# Patient Record
Sex: FEMALE | Race: WHITE | NOT HISPANIC OR LATINO | Employment: STUDENT | URBAN - METROPOLITAN AREA
[De-identification: names, ages, dates, MRNs, and addresses within clinical notes are randomized per-mention and may not be internally consistent; named-entity substitution may affect disease eponyms.]

---

## 2023-02-20 LAB — GROUP A STREP, PCR: NOT DETECTED

## 2023-02-22 LAB
EBV INTERPRETATION: ABNORMAL
EPSTEIN-BARR VCA IGG: POSITIVE
EPSTEIN-BARR VCA IGM: NEGATIVE
EPSTEIN-BARR VIRUS EARLY ANTIGEN ANTIBODY, IGG: NEGATIVE
EPSTIEN-BARR NUCLEAR ANTIGEN AB: POSITIVE

## 2023-03-09 PROBLEM — B37.31 VAGINAL YEAST INFECTION: Status: ACTIVE | Noted: 2023-03-09

## 2023-03-09 PROBLEM — F42.8 OBSESSIVE THINKING: Status: ACTIVE | Noted: 2023-03-09

## 2023-03-09 PROBLEM — Z04.9 CONDITION NOT FOUND: Status: ACTIVE | Noted: 2023-03-09

## 2023-03-09 PROBLEM — J02.9 SORE THROAT: Status: ACTIVE | Noted: 2023-03-09

## 2023-03-09 PROBLEM — U07.1 COVID-19: Status: ACTIVE | Noted: 2023-03-09

## 2023-03-09 PROBLEM — L70.9 ACNE: Status: ACTIVE | Noted: 2023-03-09

## 2023-03-09 PROBLEM — D16.9 ENCHONDROMA OF BONE: Status: ACTIVE | Noted: 2023-03-09

## 2023-03-09 PROBLEM — F41.9 ANXIETY: Status: ACTIVE | Noted: 2023-03-09

## 2023-03-09 PROBLEM — R53.83 FATIGUE: Status: ACTIVE | Noted: 2023-03-09

## 2023-03-09 PROBLEM — J35.1 TONSILLAR HYPERTROPHY: Status: ACTIVE | Noted: 2023-03-09

## 2023-03-09 RX ORDER — SERTRALINE HYDROCHLORIDE 50 MG/1
1 TABLET, FILM COATED ORAL DAILY
COMMUNITY
Start: 2021-09-13 | End: 2023-12-08 | Stop reason: SDUPTHER

## 2023-03-09 RX ORDER — NORGESTIMATE AND ETHINYL ESTRADIOL 0.25-0.035
1 KIT ORAL DAILY
COMMUNITY
Start: 2020-06-03 | End: 2023-10-13 | Stop reason: SDUPTHER

## 2023-03-09 RX ORDER — UBIDECARENONE 30 MG
CAPSULE ORAL
COMMUNITY

## 2023-03-10 ENCOUNTER — OFFICE VISIT (OUTPATIENT)
Dept: PEDIATRICS | Facility: CLINIC | Age: 23
End: 2023-03-10
Payer: COMMERCIAL

## 2023-03-10 VITALS — BODY MASS INDEX: 20.73 KG/M2 | WEIGHT: 119.8 LBS | TEMPERATURE: 97.8 F

## 2023-03-10 DIAGNOSIS — L04.9 ACUTE LYMPHADENITIS: Primary | ICD-10-CM

## 2023-03-10 DIAGNOSIS — J02.9 SORE THROAT: ICD-10-CM

## 2023-03-10 LAB
GROUP A STREP, PCR: NOT DETECTED
POC RAPID STREP: NEGATIVE

## 2023-03-10 PROCEDURE — 87880 STREP A ASSAY W/OPTIC: CPT | Performed by: PEDIATRICS

## 2023-03-10 PROCEDURE — 87651 STREP A DNA AMP PROBE: CPT

## 2023-03-10 PROCEDURE — 99214 OFFICE O/P EST MOD 30 MIN: CPT | Performed by: PEDIATRICS

## 2023-03-10 RX ORDER — AMOXICILLIN AND CLAVULANATE POTASSIUM 875; 125 MG/1; MG/1
1 TABLET, FILM COATED ORAL 2 TIMES DAILY
Qty: 20 TABLET | Refills: 1 | Status: SHIPPED | OUTPATIENT
Start: 2023-03-10 | End: 2023-03-20

## 2023-03-10 NOTE — PROGRESS NOTES
Subjective     Amara Field is here with her father for a sick visit.    H/o recurrent strep since December, 5th episode of  fever, swollen LN, URI. Dad is concerned because of recurrent infections.  She had mono positive blood work 3 weeks ago, old infection.    2 days ago Amara developed fever 102 and swollen LN left anterior cervical.  She is at [employer], lives in a house, lots of illnesses there but everyone else seems to get over them and Amara has not been able to. No weight loss, decreased appetite for a couple of days. No n/v/d now. Sometimes vomits  when sick/fever. Sleeping ok;  tired when sick. Exercising fine, no problem with weight lifting and rock climbing.        Objective     Temp 36.6 °C (97.8 °F) (Oral)   Wt 54.3 kg (119 lb 12.8 oz) Comment: 119.8lb  BMI 20.73 kg/m²       General:  Well-appearing  Well-hydrated  No acute distress   Eyes:  Lids:  normal  Conjunctivae:  normal   ENT:  Ears:  RTM: normal           LTM:  normal  Nose:  nasal secretions  Mouth:  mucosa moist; no visible lesions  Throat:  OP moist and clear; uvula midline tonsils 3+ noninfected  Neck:  supple; 2cm tender left anterior cervical LN, 1 cm right anterior cervical LN   Respiratory:  Respiratory rate:  normal  Air exchange:  normal   Adventitious breath sounds:  none  Accessory muscle use:  none   Heart:  Rate and rhythm:  regular  Murmur:  none    GI: Soft, NTND, NO HSM no masses   Skin:  Warm and well-perfused  No rashes apparent   Lymphatic: Shotty, 2 cm tender right >left cervical nodes  No other nodes larger than 1 cm palpated  No firm or fixed nodes palpated           Assessment/Plan       Amara Field is well-appearing, well-hydrated, in no acute distress, and afebrile at today's visit.    she may have lymphadenitis Left>Right.   I reviewed labs and notes from other recent visits.   Rapid strep was negative today, PCR sent.   RX for Augmentin for 10-14 days.     Refer to ENT to evaluate recurrent tonsillitis, strep  infections.     Supportive care measures and expected course of illness reviewed.    Follow up promptly for worsening or prolonged illness.

## 2023-04-27 ENCOUNTER — TELEPHONE (OUTPATIENT)
Dept: PEDIATRICS | Facility: CLINIC | Age: 23
End: 2023-04-27
Payer: COMMERCIAL

## 2023-04-27 ENCOUNTER — OFFICE VISIT (OUTPATIENT)
Dept: PEDIATRICS | Facility: CLINIC | Age: 23
End: 2023-04-27
Payer: COMMERCIAL

## 2023-04-27 VITALS — TEMPERATURE: 100.2 F | BODY MASS INDEX: 20.26 KG/M2 | WEIGHT: 117.13 LBS

## 2023-04-27 DIAGNOSIS — J02.9 SORE THROAT: ICD-10-CM

## 2023-04-27 LAB — POC RAPID STREP: NEGATIVE

## 2023-04-27 PROCEDURE — 87880 STREP A ASSAY W/OPTIC: CPT | Performed by: PEDIATRICS

## 2023-04-27 PROCEDURE — 87651 STREP A DNA AMP PROBE: CPT

## 2023-04-27 PROCEDURE — 99213 OFFICE O/P EST LOW 20 MIN: CPT | Performed by: PEDIATRICS

## 2023-04-27 ASSESSMENT — ENCOUNTER SYMPTOMS: FEVER: 1

## 2023-04-27 NOTE — PROGRESS NOTES
Amara Field is a 22 y.o. who presents for Fever.      Fever       Amara has had a sore throat and fatigue for several days.  She has had a low grade fever.  In addition, she has had issues with an intermittent left anterior cervical lymph node enlarging intermittently over the last few months.  She has an ENT eval for this scheduled  It currently has enlarged with this illness.  It is not painful.  She has not had cough or nasal congestion.  She states that she has had mono in the past (EBV panel done 2 months ago confirms this -- c/w remote infection).    Objective   Temp 37.9 °C (100.2 °F)   Wt 53.1 kg (117 lb 2 oz)   BMI 20.26 kg/m²     Physical Exam  Constitutional:       Appearance: Normal appearance.   HENT:      Right Ear: Tympanic membrane normal.      Left Ear: Tympanic membrane normal.      Nose: Nose normal.      Mouth/Throat:      Mouth: Mucous membranes are moist.      Pharynx: Posterior oropharyngeal erythema present.   Eyes:      Conjunctiva/sclera: Conjunctivae normal.   Cardiovascular:      Rate and Rhythm: Normal rate and regular rhythm.      Heart sounds: Normal heart sounds.   Pulmonary:      Effort: Pulmonary effort is normal.      Breath sounds: Normal breath sounds.   Abdominal:      General: Bowel sounds are normal.      Palpations: There is no hepatomegaly or splenomegaly.   Musculoskeletal:      Cervical back: Normal range of motion and neck supple.   Lymphadenopathy:      Cervical: Cervical adenopathy present.      Comments: There is a 3 x 2 cm non tender left anterior cervical lymph node.  There is no other significant cervical, supraclavicular, or axillary adenopathy.   Neurological:      Mental Status: She is alert.         Assessment/Plan   Amara has pharyngitis and a viral etiology is suspected.  Rapid strep was negative and a strep pcr was sent for confirmation.    Today we discussed a typical course of illness, symptomatic treatment, and signs of worsening/when to seek medical  care.    Problem List Items Addressed This Visit       Sore throat    Relevant Orders    POCT rapid strep A manually resulted (Completed)    Group A Streptococcus, PCR

## 2023-04-28 LAB — GROUP A STREP, PCR: NOT DETECTED

## 2023-06-29 ENCOUNTER — TELEPHONE (OUTPATIENT)
Dept: PEDIATRICS | Facility: CLINIC | Age: 23
End: 2023-06-29
Payer: COMMERCIAL

## 2023-06-29 DIAGNOSIS — R10.84 GENERALIZED ABDOMINAL PAIN: Primary | ICD-10-CM

## 2023-06-29 NOTE — TELEPHONE ENCOUNTER
Can you place a referral for a GI specialist. The physician is Dr. Dionisio Barry @ [facility]. Amara has a long h/o abdominal issues per mother.     [email or web address]    [phone]

## 2023-06-30 ENCOUNTER — TELEPHONE (OUTPATIENT)
Dept: PEDIATRICS | Facility: CLINIC | Age: 23
End: 2023-06-30
Payer: COMMERCIAL

## 2023-06-30 ENCOUNTER — OFFICE VISIT (OUTPATIENT)
Dept: PEDIATRICS | Facility: CLINIC | Age: 23
End: 2023-06-30
Payer: COMMERCIAL

## 2023-06-30 VITALS — WEIGHT: 113.25 LBS | BODY MASS INDEX: 19.59 KG/M2 | TEMPERATURE: 97.8 F

## 2023-06-30 DIAGNOSIS — G89.29 CHRONIC ABDOMINAL PAIN: Primary | ICD-10-CM

## 2023-06-30 DIAGNOSIS — J02.9 SORE THROAT: ICD-10-CM

## 2023-06-30 DIAGNOSIS — R10.9 CHRONIC ABDOMINAL PAIN: Primary | ICD-10-CM

## 2023-06-30 LAB
GROUP A STREP, PCR: DETECTED
POC RAPID STREP: NEGATIVE

## 2023-06-30 PROCEDURE — 99213 OFFICE O/P EST LOW 20 MIN: CPT | Performed by: PEDIATRICS

## 2023-06-30 PROCEDURE — 87880 STREP A ASSAY W/OPTIC: CPT | Performed by: PEDIATRICS

## 2023-06-30 PROCEDURE — 87651 STREP A DNA AMP PROBE: CPT

## 2023-06-30 RX ORDER — AMOXICILLIN AND CLAVULANATE POTASSIUM 875; 125 MG/1; MG/1
1 TABLET, FILM COATED ORAL 2 TIMES DAILY
Qty: 20 TABLET | Refills: 0 | Status: SHIPPED | OUTPATIENT
Start: 2023-06-30 | End: 2023-07-10

## 2023-06-30 NOTE — TELEPHONE ENCOUNTER
Mom calling- having tonsillectomy next week, however seems that she has strep again.  Coming in for eval.

## 2023-06-30 NOTE — PROGRESS NOTES
Amara Field is a 22 y.o. who presents for Sore Throat.    HPI  Amara has had sore throat, fever, and nausea/vomiting for the last 2 days.  She was seen in the ER a week ago for vomiting and abdominal pain, although this resolved and she does not think it is related.  Amara has chronic issues with pharyngitis and recurrent strep and is scheduled for a tonsillectomy in 2 weeks.    No cough, no nasal congestion.  Her friend has sore throat and fever.  In addition, Amara is requesting a referral to GI at the [facility] for issues with chronic abdominal pain, constipation, and intermittent vomiting.  Objective   Temp 36.6 °C (97.8 °F)   Wt 51.4 kg (113 lb 4 oz)   BMI 19.59 kg/m²     Physical Exam  Constitutional:       Appearance: Normal appearance.   HENT:      Right Ear: Tympanic membrane normal.      Left Ear: Tympanic membrane normal.      Nose: Nose normal.      Mouth/Throat:      Mouth: Mucous membranes are moist.      Pharynx: Posterior oropharyngeal erythema present.      Comments: 3+ tonsils  Eyes:      Conjunctiva/sclera: Conjunctivae normal.   Cardiovascular:      Rate and Rhythm: Normal rate and regular rhythm.      Heart sounds: Normal heart sounds.   Pulmonary:      Effort: Pulmonary effort is normal.      Breath sounds: Normal breath sounds.   Abdominal:      General: Bowel sounds are normal.      Tenderness: There is no abdominal tenderness.   Musculoskeletal:      Cervical back: Normal range of motion and neck supple.   Neurological:      Mental Status: She is alert.         Assessment/Plan   Amara has symptoms that are consistent with strep throat, although a viral etiology is also possible.  She is scheduled for tonsillectomy in the near future so she was presumptively started on Augmentin;  a strep pcr was sent and antibiotics will be stopped if it is negative.    A GI referral was printed for her.    Today we discussed a typical course of illness, symptomatic treatment, and signs of worsening/when to seek  medical care.

## 2023-07-01 ENCOUNTER — TELEPHONE (OUTPATIENT)
Dept: PEDIATRICS | Facility: CLINIC | Age: 23
End: 2023-07-01
Payer: COMMERCIAL

## 2023-07-10 LAB
ANION GAP IN SER/PLAS: 12 MMOL/L (ref 10–20)
CALCIUM (MG/DL) IN SER/PLAS: 9.5 MG/DL (ref 8.6–10.3)
CARBON DIOXIDE, TOTAL (MMOL/L) IN SER/PLAS: 24 MMOL/L (ref 21–32)
CHLORIDE (MMOL/L) IN SER/PLAS: 105 MMOL/L (ref 98–107)
CREATININE (MG/DL) IN SER/PLAS: 0.64 MG/DL (ref 0.5–1.05)
ERYTHROCYTE DISTRIBUTION WIDTH (RATIO) BY AUTOMATED COUNT: 13.9 % (ref 11.5–14.5)
ERYTHROCYTE MEAN CORPUSCULAR HEMOGLOBIN CONCENTRATION (G/DL) BY AUTOMATED: 32.6 G/DL (ref 32–36)
ERYTHROCYTE MEAN CORPUSCULAR VOLUME (FL) BY AUTOMATED COUNT: 83 FL (ref 80–100)
ERYTHROCYTES (10*6/UL) IN BLOOD BY AUTOMATED COUNT: 4.52 X10E12/L (ref 4–5.2)
GFR FEMALE: >90 ML/MIN/1.73M2
GLUCOSE (MG/DL) IN SER/PLAS: 93 MG/DL (ref 74–99)
HEMATOCRIT (%) IN BLOOD BY AUTOMATED COUNT: 37.4 % (ref 36–46)
HEMOGLOBIN (G/DL) IN BLOOD: 12.2 G/DL (ref 12–16)
LEUKOCYTES (10*3/UL) IN BLOOD BY AUTOMATED COUNT: 8.8 X10E9/L (ref 4.4–11.3)
PLATELETS (10*3/UL) IN BLOOD AUTOMATED COUNT: 353 X10E9/L (ref 150–450)
POTASSIUM (MMOL/L) IN SER/PLAS: 3.9 MMOL/L (ref 3.5–5.3)
SODIUM (MMOL/L) IN SER/PLAS: 137 MMOL/L (ref 136–145)
UREA NITROGEN (MG/DL) IN SER/PLAS: 10 MG/DL (ref 6–23)

## 2023-07-18 ENCOUNTER — HOSPITAL ENCOUNTER (OUTPATIENT)
Dept: DATA CONVERSION | Facility: HOSPITAL | Age: 23
End: 2023-07-18
Attending: OTOLARYNGOLOGY | Admitting: OTOLARYNGOLOGY
Payer: COMMERCIAL

## 2023-07-18 DIAGNOSIS — J35.01 CHRONIC TONSILLITIS: ICD-10-CM

## 2023-07-18 DIAGNOSIS — J03.91 ACUTE RECURRENT TONSILLITIS, UNSPECIFIED: ICD-10-CM

## 2023-08-02 LAB
COMPLETE PATHOLOGY REPORT: NORMAL
CONVERTED CLINICAL DIAGNOSIS-HISTORY: NORMAL
CONVERTED FINAL DIAGNOSIS: NORMAL
CONVERTED FINAL REPORT PDF LINK TO COPY AND PASTE: NORMAL
CONVERTED GROSS DESCRIPTION: NORMAL

## 2023-09-29 VITALS
DIASTOLIC BLOOD PRESSURE: 65 MMHG | HEART RATE: 75 BPM | SYSTOLIC BLOOD PRESSURE: 107 MMHG | TEMPERATURE: 97.5 F | RESPIRATION RATE: 20 BRPM

## 2023-09-30 NOTE — H&P
History of Present Illness:   Pregnant/Lactating:  ·  Are You Pregnant no   ·  Are You Currently Breastfeeding no     History Present Illness:  Reason for surgery: Chronic tonsillitis   HPI:    This patient is a 22-year-old female who was recently seen and evaluated in ENT clinic for episodes of recurrent tonsillitis.  Given her frequency of infections and  continued symptoms, decision was made to proceed to the operating room for tonsillectomy.  Risks, benefits, alternatives were discussed with the patient ultimately agreed to proceed.  She presents today for surgery.    Allergies:        Allergies:  ·  No Known Allergies :     Home Medication Review:   Home Medications Reviewed: yes     Impression/Procedure:   ·  Impression and Planned Procedure: Tonsillectomy       ERAS (Enhanced Recovery After Surgery):  ·  ERAS Patient: no       Vital Signs:  Temperature C: 36.4 degrees C   Temperature F: 97.5 degrees F   Heart Rate: 75 beats per minute   Respiratory Rate: 20 breath per minute   Blood Pressure Systolic: 107 mm/Hg   Blood Pressure Diastolic: 65 mm/Hg     Physical Exam by System:    Constitutional: Well developed, awake/alert/oriented  x3, no distress, alert and cooperative   Eyes: Sclera nonicteric.  EOMI   Head/Neck: Neck supple, no apparent injury, No JVD,  trachea midline   Respiratory/Thorax: Good chest expansion, thorax  symmetric.  Breathing unlabored   Cardiovascular: No signs of peripheral edema or cyanosis   Extremities: Moving all extremities spontaneously.   No evidence of clubbing or contracture   Neurological: alert and oriented x3, intact senses,  normal strength   Psychological: Appropriate mood and behavior   Skin: Warm and dry, no lesions, no rashes     Consent:   COVID-19 Consent:  ·  COVID-19 Risk Consent Surgeon has reviewed key risks related to the risk of margot COVID-19 and if they contract COVID-19 what the risks are.     Attestation:   Note Completion:  I am a:  Resident/Fellow    Attending Attestation I saw and evaluated the patient.  I personally obtained the key and critical portions of the history and physical exam or was physically present for key and  critical portions performed by the resident/fellow. I reviewed the resident/fellow?s documentation and discussed the patient with the resident/fellow.  I agree with the resident/fellow?s medical decision making as documented in the note.     I personally evaluated the patient on 18-Jul-2023         Electronic Signatures:  Rey Peralta (Resident))  (Signed 18-Jul-2023 07:15)   Authored: History of Present Illness, Allergies, Home  Medication Review, Impression/Procedure, ERAS, Physical Exam, Consent, Note Completion  Jessica Vogel)  (Signed 27-Jul-2023 13:26)   Authored: Note Completion   Co-Signer: History of Present Illness, Allergies, Home Medication Review, Impression/Procedure, ERAS, Physical Exam, Consent, Note Completion      Last Updated: 27-Jul-2023 13:26 by Jessica Vogel)

## 2023-10-02 NOTE — OP NOTE
PROCEDURE DETAILS    Preoperative Diagnosis:  Chronic tonsillitis  Postoperative Diagnosis:  Chronic tonsillitis  Surgeon: Jessica Vogel MD  Resident/Fellow/Other Assistant: Rey Peralta MD    Procedure:  1. Tonsillectomy  Anesthesia: General  Estimated Blood Loss: 10  Findings: 1. 2+ tonsils, inflamed with obscured surgical planes  Specimens(s) Collected: yes,  Bilateral tonsils   Complications: None  Patient Returned To/Condition: PACU/Satisfactory        Operative Report:   Operative Indications:  This patient is a 22-year-old female who presented to outpatient ENT clinic with recurrent tonsillitis, for whom decision was made to proceed to the operating room for tonsillectomy.  Risks, benefits, alternatives were discussed with patient ultimately  agreed to proceed.  Informed consent was obtained.    Description of Procedure:  The patient was brought to the operating room by anesthesia, induced under general endotracheal anesthesia. The patient was turned 90 degrees counterclockwise. A Nikki Lg mouth gag was used to expose the oropharynx. The palate was carefully inspected.  Torus palatinus noted. At this point, the right tonsil was grasped and retracted medially. Using electrocautery at a setting of 25 the tonsils was freed in a superior-to-inferior direction preserving both the anterior and posterior pillars.  Attention  was turned to the left tonsil. Exact same procedure was performed. The oropharynx was irrigated. Hemostasis was achieved with suction electrocautery.     The stomach was suctioned with orogastric tube, and the patient was turned towards Anesthesia, awoken, and transferred to the PACU in stable condition.    Dr. Vogel was present and participated in all critical portions of the procedure.                        Attestation:   Note Completion:  Attending Attestation I was present for key portions of the procedure and the procedure lasted longer than 5 minutes.    I am a:  Resident/Fellow         Electronic Signatures:  Rey Peralta (Resident))  (Signed 18-Jul-2023 08:59)   Authored: Post-Operative Note, Chart Review, Note Completion  Jessica Vogel)  (Signed 27-Jul-2023 13:54)   Authored: Post-Operative Note, Chart Review, Note Completion   Co-Signer: Post-Operative Note, Chart Review, Note Completion      Last Updated: 27-Jul-2023 13:54 by Jessica Vogel)

## 2023-10-12 PROBLEM — R53.83 FATIGUE: Status: RESOLVED | Noted: 2023-03-09 | Resolved: 2023-10-12

## 2023-10-12 PROBLEM — Z04.9 CONDITION NOT FOUND: Status: RESOLVED | Noted: 2023-03-09 | Resolved: 2023-10-12

## 2023-10-12 PROBLEM — J35.1 TONSILLAR HYPERTROPHY: Status: RESOLVED | Noted: 2023-03-09 | Resolved: 2023-10-12

## 2023-10-12 PROBLEM — B37.31 VAGINAL YEAST INFECTION: Status: RESOLVED | Noted: 2023-03-09 | Resolved: 2023-10-12

## 2023-10-12 PROBLEM — J02.9 SORE THROAT: Status: RESOLVED | Noted: 2023-03-09 | Resolved: 2023-10-12

## 2023-10-12 PROBLEM — J03.91 RECURRENT ACUTE TONSILLITIS: Status: RESOLVED | Noted: 2023-10-12 | Resolved: 2023-10-12

## 2023-10-12 PROBLEM — U07.1 COVID-19: Status: RESOLVED | Noted: 2023-03-09 | Resolved: 2023-10-12

## 2023-10-13 ENCOUNTER — OFFICE VISIT (OUTPATIENT)
Dept: PEDIATRICS | Facility: CLINIC | Age: 23
End: 2023-10-13
Payer: COMMERCIAL

## 2023-10-13 VITALS
HEIGHT: 64 IN | WEIGHT: 118 LBS | HEART RATE: 67 BPM | SYSTOLIC BLOOD PRESSURE: 117 MMHG | DIASTOLIC BLOOD PRESSURE: 75 MMHG | BODY MASS INDEX: 20.14 KG/M2

## 2023-10-13 DIAGNOSIS — Z30.011 ENCOUNTER FOR INITIAL PRESCRIPTION OF CONTRACEPTIVE PILLS: ICD-10-CM

## 2023-10-13 DIAGNOSIS — Z23 ENCOUNTER FOR IMMUNIZATION: ICD-10-CM

## 2023-10-13 DIAGNOSIS — Z00.00 WELLNESS EXAMINATION: Primary | ICD-10-CM

## 2023-10-13 PROCEDURE — 90715 TDAP VACCINE 7 YRS/> IM: CPT | Performed by: PEDIATRICS

## 2023-10-13 PROCEDURE — 99395 PREV VISIT EST AGE 18-39: CPT | Performed by: PEDIATRICS

## 2023-10-13 PROCEDURE — 90471 IMMUNIZATION ADMIN: CPT | Performed by: PEDIATRICS

## 2023-10-13 PROCEDURE — 1036F TOBACCO NON-USER: CPT | Performed by: PEDIATRICS

## 2023-10-13 PROCEDURE — 96127 BRIEF EMOTIONAL/BEHAV ASSMT: CPT | Performed by: PEDIATRICS

## 2023-10-13 PROCEDURE — 3008F BODY MASS INDEX DOCD: CPT | Performed by: PEDIATRICS

## 2023-10-13 RX ORDER — NORGESTIMATE AND ETHINYL ESTRADIOL 0.25-0.035
1 KIT ORAL DAILY
Qty: 84 TABLET | Refills: 3 | Status: SHIPPED | OUTPATIENT
Start: 2023-10-13 | End: 2024-10-12

## 2023-10-13 ASSESSMENT — PATIENT HEALTH QUESTIONNAIRE - PHQ9
1. LITTLE INTEREST OR PLEASURE IN DOING THINGS: NOT AT ALL
2. FEELING DOWN, DEPRESSED OR HOPELESS: NOT AT ALL
SUM OF ALL RESPONSES TO PHQ9 QUESTIONS 1 AND 2: 0

## 2023-10-13 NOTE — PROGRESS NOTES
"Graciela Maloney is here her annual well visit.    Questions or concerns:  She quit taking OCPs when she went off Accutane, but she would like to restart.  She is s/p tonsillectomy    Nutrition, Elimination, and Sleep:  Nutrition:  well-balanced diet  Elimination:  normal frequency and quality of stool  Sleep:  adequate, no snoring identified    Currently menstruating? yes; current menstrual pattern: regular every month without intermenstrual spotting    Social:  Peer relations:  no concerns  Family relations:  no concerns  School performance:  no concerns  Teen questionnaire:  reviewed  Activities:  sustainability, working at an office    Objective   /75   Pulse 67   Ht 1.623 m (5' 3.88\")   Wt 53.5 kg (118 lb)   BMI 20.33 kg/m²   Physical Exam  Vitals reviewed.   Constitutional:       General: She is not in acute distress.     Appearance: Normal appearance. She is not ill-appearing.   HENT:      Head: Normocephalic and atraumatic.      Right Ear: Tympanic membrane, ear canal and external ear normal.      Left Ear: Tympanic membrane, ear canal and external ear normal.      Nose: Nose normal.      Mouth/Throat:      Mouth: Mucous membranes are moist.      Pharynx: Oropharynx is clear.   Eyes:      Extraocular Movements: Extraocular movements intact.      Conjunctiva/sclera: Conjunctivae normal.      Pupils: Pupils are equal, round, and reactive to light.   Neck:      Thyroid: No thyroid mass or thyromegaly.   Cardiovascular:      Rate and Rhythm: Normal rate and regular rhythm.      Pulses: Normal pulses.      Heart sounds: Normal heart sounds. No murmur heard.     No gallop.   Pulmonary:      Effort: Pulmonary effort is normal. No respiratory distress.      Breath sounds: Normal breath sounds.   Chest:   Breasts:     Ankur Score is 5.   Abdominal:      General: There is no distension.      Palpations: Abdomen is soft. There is no hepatomegaly, splenomegaly or mass.      Tenderness: There is no " abdominal tenderness.      Hernia: No hernia is present.   Genitourinary:     Ankur stage (genital): 5.   Musculoskeletal:         General: No swelling, deformity or signs of injury. Normal range of motion.      Cervical back: Normal range of motion and neck supple.      Thoracic back: No scoliosis.   Lymphadenopathy:      Comments: no significant lymphadenopathy > 1 cm   Skin:     General: Skin is warm and dry.   Neurological:      General: No focal deficit present.      Motor: No weakness.   Psychiatric:         Mood and Affect: Mood normal.         Thought Content: Thought content normal.          Assessment/Plan   1. Wellness examination        2. Encounter for immunization  Tdap vaccine, age 7 years and older      3. BMI (body mass index), pediatric, 5% to less than 85% for age        4. Encounter for initial prescription of contraceptive pills  norgestimate-ethinyl estradioL (Sprintec, 28,) 0.25-35 mg-mcg tablet         Amara is a healthy and thriving adult.    - Guidance regarding safety, nutrition, physical activity, and sleep reviewed.  - Social:  teenage questionnaire completed and reviewed.  Issues of smoking, vaping, substance use, sexuality, and mood discussed.    - Vaccines:  as documented; declines flu and COVID-19 today  - Return in 1 year for annual well exam or sooner if concerns arise

## 2023-10-13 NOTE — PATIENT INSTRUCTIONS
"You received a Tdap (tetanus booster) today    GYNECOLOGISTS:     [phone]  Dr. Suyapa Graves - [facility]  Dr. Genesis Cornejo - [facility]  Dr. Marixa Robertson - [facility]  Dr. Corinne Bazella - [facility]  Dr. Michelle Quintero - [facility]  Dr. Yoly Nix - [place]    [phone]  Dr. Gill Goyal, [place]    [phone]  Dr. Anthony Mixon - [place]    [phone]  Dr. Rosalia Mariee - [facility]  [phone]  Dr. Carol Lim and partners ([place])    [phone]  Dr. Geri Reynolds and partners ([place])    [phone]  Dr. Yareli Kirkpatrick ([facility])      Taking Oral Contraceptives    How they work  The hormones in your birth control pill prevent pregnancy primarily by:  Preventing the ovary from releasing an egg  Thickening the mucus at the entrance to the uterus, blocking the sperm from getting in  When birth control pills are taken properly, the chance of becoming pregnant is ~5 in 100.    Taking your pill  Take one pill every day, preferably at the same time each day. The pill only works if taken every day, even if you don't have sex very often.    Start your pills the first Sunday after the first day of your next period, or the first day of your period if your period begins on a Sunday. The pill will become effective after you take it for 7 days.  Although using a condom for disease protection decreases your pregnancy risk, the safest thing to do is abstain from sex until you've taken a full week-of pills.    Most pill packs have two kinds of pills: \"active\" pills with hormone, and \"placebo\" pills without hormone. You need to take all the active pills to prevent pregnancy.  Taking the placebos is the trigger for your period.  Take the placebo pills unless your healthcare provider tells you otherwise. There is no sugar in the placebo pills.    If you bleed between your periods  Breakthrough " "bleeding is common and expected in the first 3 months of pill use. It can be as light as spotting or as heavy as a period. DO NOT skip or stop your pills. DO call your health care provider if you are concerned.    What to do if you miss an \"active\" pill:  If you are late taking a pill, take it as soon as you remember it.  If you miss taking a pill for one day, take it as soon as you remember it the next day.  Take that day's pill at the regular time. If this means taking 2 pills at once, that's ok.  If you miss two pills, take 2 pills a day for the next two days, then finish your pack of pills as usual. You are at risk for getting pregnant in the 7 days after missing pills.  Although using a condom for disease protection decreases your pregnancy risk, the safest thing to do is abstain from sex until you've taken a full week of pills again.  If you miss more than 2 pills, throw out the pack of pills and start a new pack that day.  You are at risk for getting pregnant in the 7 days after missing pills. Although using a condom for disease protection decreases your -pregnancy risk, the safest thing to do is abstain from sex until you've taken a full week of pills again.    Call you doctor right away if you have:  Severe stomach or chest pain  Trouble breathing  Unusual, severe, or worsening headaches  Difficulty seeing  Leg pain or swelling    Other benefits  Taking oral contraceptives may have certain additional benefits:  Lighter, less painful, and more regular periods  Decreased acne and less unwanted hair growth  Fewer ovarian cysts  Fewer benign (harmless) breast lumps  Fewer pelvic infections  Lower risk of ovarian and uterine cancer  Stronger bones    What are the risks?  Women taking the pill have a small increased risk for getting a blood clot of the leg or lung. This risk is very low: about 1 in 15,000 women.    If you have any of the following conditions, you should not take the pill: breast or uterine " cancer, unexplained vaginal bleeding, heart disease, blood clot or stroke, or liver disease. If you take any other medications or herbal supplements, discuss this with your health care provider.    The birth control pill does not protect against HIV (AIDS) or other sexually transmitted infections. Regardless of contraceptive method, always use a condom!

## 2023-12-08 ENCOUNTER — TELEMEDICINE (OUTPATIENT)
Dept: BEHAVIORAL HEALTH | Facility: CLINIC | Age: 23
End: 2023-12-08
Payer: COMMERCIAL

## 2023-12-08 DIAGNOSIS — F42.8 OBSESSIVE THINKING: ICD-10-CM

## 2023-12-08 DIAGNOSIS — F41.9 ANXIETY: ICD-10-CM

## 2023-12-08 PROCEDURE — 99213 OFFICE O/P EST LOW 20 MIN: CPT | Performed by: PSYCHIATRY & NEUROLOGY

## 2023-12-08 RX ORDER — SERTRALINE HYDROCHLORIDE 50 MG/1
50 TABLET, FILM COATED ORAL DAILY
Qty: 30 TABLET | Refills: 2 | Status: SHIPPED | OUTPATIENT
Start: 2023-12-08 | End: 2024-02-09 | Stop reason: SDUPTHER

## 2023-12-08 NOTE — PROGRESS NOTES
"Subjective    All Individuals Present: Patient and Provider (Encounter Provider)     ID: Amara Field is a 23 y.o. female with anxiety    School Address: [address] [place] [address]     Interval History/HPI/PFSH:  Pt states that she has been \"pretty good\" since last appointment. She has finals right now which is \"laura stressful\" with 1 online final and 3 finals next week on [employer]. She feels \"okay\" about finals, \"not super nervous.\" She thinks she is going to do \"fine\" and has \"pretty good grades.\"    The semester has been \"okay\" so far, \"not too stressful.\"    On ROS, she is future-oriented to finals. She has been enjoying hanging out with people. She reports anxiety is well controlled, usually a 2/10, higher now due to finals. Mood overall \"pretty good,\" \"a lot better than it was.\" Denies feeling down or depressed. No panic attacks. No change in sleep or appetite. No SI. No HI. No AVH.     Medication side effects: None Reported     Review of Systems  See HPI.    Objective   There were no vitals taken for this visit.  Wt Readings from Last 4 Encounters:   10/13/23 53.5 kg (118 lb)   06/30/23 51.4 kg (113 lb 4 oz)   05/03/23 52 kg (114 lb 11.2 oz)   04/27/23 53.1 kg (117 lb 2 oz)       Mental Status Exam  General Appearance: Well groomed, appropriate eye contact.  Attitude/Behavior: Cooperative, conversant, engaged, and with good eye contact.  Motor: No psychomotor agitation or retardation, no tremor or other abnormal movements.  Speech: Normal rate, volume, prosody  Gait/Station: Within normal limits  Mood: \"pretty good\".  Affect: Euthymic, full-range  Thought Process: Linear, goal directed  Thought Associations: No loosening of associations  Thought Content: normal  Sensorium: Alert and oriented to person, place, time and situation  Insight:  intact  Judgment: Intact  Cognition: Cognitively intact to conversational testing with respect to attention, orientation, fund of knowledge, recent and remote " memory, and language.  Testing: N/A.    Laboratory/Imaging/Diagnostic Tests   N/A    Assessment/Plan   Overall Formulation and Differential Diagnosis:  Amara Field is a 23 y.o. female who meets criteria for anxiety and obsessional thinking  Interval Assessment:  Doing well with well controlled anxiety.  Risk Assessment:  Imminent Risk of Suicide or Serious Self-Injury: Low Risk -- Risk factors include: Age Protective factors include:Denies current suicidal ideation, Denies history of suicide attempts , Future-oriented talk , Willingness to seek help and support , Skills in problem solving, conflict resolution, and nonviolent handling of disputes, Cultural and [employer]ism beliefs that discourage suicide and support self-preservation , Access to a variety of clinical interventions , Receiving and engaged in care for mental, physical, and substance use disorders , History of adhering to treatment recommendations and/or prescribed medication regimen , Support through ongoing medical and mental healthcare relationships , Current/history of good response to treatment/meds , and Interpersonal relationships and supports, e.g., family, friends, peers, community   Imminent Risk of Violence or Homicide: Low Risk - Risk factors include: No significant risk factors identified on screening. Protective factors include: Lack of known history of harm to others , Lack of known history of violent ideation , Lack of known access to firearms , Sense of community, availability/access to resources and support , Sense of optimism, hope , Interpersonal competence , Affect regulation , Sense of self-efficacy, internal locus of control , and Positive, pro-social family/peer network   Treatment Plan:  There are no recently modified care plans to display for this patient.    -continue Zoloft 50mg PO daily.  -RTC 2-3 months.

## 2024-02-02 ENCOUNTER — APPOINTMENT (OUTPATIENT)
Dept: BEHAVIORAL HEALTH | Facility: CLINIC | Age: 24
End: 2024-02-02
Payer: COMMERCIAL

## 2024-02-09 ENCOUNTER — TELEMEDICINE (OUTPATIENT)
Dept: BEHAVIORAL HEALTH | Facility: CLINIC | Age: 24
End: 2024-02-09
Payer: COMMERCIAL

## 2024-02-09 DIAGNOSIS — F41.9 ANXIETY: ICD-10-CM

## 2024-02-09 DIAGNOSIS — F42.8 OBSESSIVE THINKING: ICD-10-CM

## 2024-02-09 PROCEDURE — 1036F TOBACCO NON-USER: CPT | Performed by: PSYCHIATRY & NEUROLOGY

## 2024-02-09 PROCEDURE — 99214 OFFICE O/P EST MOD 30 MIN: CPT | Performed by: PSYCHIATRY & NEUROLOGY

## 2024-02-09 PROCEDURE — 3008F BODY MASS INDEX DOCD: CPT | Performed by: PSYCHIATRY & NEUROLOGY

## 2024-02-09 RX ORDER — SERTRALINE HYDROCHLORIDE 50 MG/1
50 TABLET, FILM COATED ORAL DAILY
Qty: 30 TABLET | Refills: 2 | Status: SHIPPED | OUTPATIENT
Start: 2024-02-09 | End: 2024-04-19 | Stop reason: SDUPTHER

## 2024-02-09 NOTE — PROGRESS NOTES
"Subjective    All Individuals Present: Patient and Provider (Encounter Provider)     ID: Amara Field is a 23 y.o. female with anxiety and obsessional thinking    School Address: [address] [place] [address]      Interval History/HPI/PFSH:  Pt states she has been \"pretty decent\" since last visit. Finals were \"pretty stressful,\" but overall pt has been \"pretty good.\"    Finals went well, though she had 3 finals in a row, \"all went perfectly.\" Grades were \"pretty good.\" She plans to retake stats.    This semester has been \"really good\" so far. She had her first exam 2 days ago and thinks it went pretty well. 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    On ROS, she has been enjoying playing pickleball outside and snowboarding. Mood has been \"pretty good.\" She is able to get all of her work done early and feels like it was \"done well.\" She denies feeling down or depressed. She was \"a little anxious\" at the start of the semester but feels like it has evened out. No change in sleep or appetite. Energy \"higher than usual,\" which she attributes to good quality sleep. No SI/HI/AVH.     Medication side effects: None Reported     Review of Systems  See HPI.    Objective   There were no vitals taken for this visit.  Wt Readings from Last 4 Encounters:   10/13/23 53.5 kg (118 lb)   08/07/23 51.6 kg (113 lb 12.8 oz)   06/30/23 51.4 kg (113 lb 4 oz)   05/03/23 52 kg (114 lb 11.2 oz)       Mental Status Exam  General Appearance: Well groomed, appropriate eye contact.  Attitude/Behavior: Cooperative, conversant, engaged, and with good eye contact.  Motor: No psychomotor agitation or retardation, no tremor or other abnormal movements.  Speech: Normal rate, volume, prosody  Gait/Station: Within normal limits  Mood: \"pretty good\".  Affect: Euthymic, full-range  Thought Process: Linear, goal directed  Thought Associations: No loosening of associations  Thought Content: " normal  Sensorium: Alert and oriented to person, place, time and situation  Insight:  intact  Judgment: Intact  Cognition: Cognitively intact to conversational testing with respect to attention, orientation, fund of knowledge, recent and remote memory, and language.  Testing: N/A.    Laboratory/Imaging/Diagnostic Tests       Assessment/Plan   Overall Formulation and Differential Diagnosis:  Amara Field is a 23 y.o. female who meets criteria for anxiety and obsessive thinking  Interval Assessment:  Pt doing well, adjusting to spring semester and enjoying her classes.  Risk Assessment:  Imminent Risk of Suicide or Serious Self-Injury: Low Risk -- Risk factors include: Age Protective factors include:Denies current suicidal ideation, Denies history of suicide attempts , Future-oriented talk , Willingness to seek help and support , Skills in problem solving, conflict resolution, and nonviolent handling of disputes, Cultural and Taoist beliefs that discourage suicide and support self-preservation , Access to a variety of clinical interventions , Receiving and engaged in care for mental, physical, and substance use disorders , History of adhering to treatment recommendations and/or prescribed medication regimen , Support through ongoing medical and mental healthcare relationships , Current/history of good response to treatment/meds , and Interpersonal relationships and supports, e.g., family, friends, peers, community   Imminent Risk of Violence or Homicide: Low Risk - Risk factors include: No significant risk factors identified on screening. Protective factors include: Lack of known history of harm to others , Lack of known history of violent ideation , Lack of known access to firearms , Sense of community, availability/access to resources and support , Sense of optimism, hope , Interpersonal competence , Affect regulation , Sense of self-efficacy, internal locus of control , and Positive, pro-social family/peer  network   Treatment Plan:  There are no recently modified care plans to display for this patient.    -continue Zoloft 50mg PO daily  -continue individual therapy  -RTC 2-3 months

## 2024-03-12 ENCOUNTER — OFFICE VISIT (OUTPATIENT)
Dept: PEDIATRICS | Facility: CLINIC | Age: 24
End: 2024-03-12
Payer: COMMERCIAL

## 2024-03-12 VITALS — WEIGHT: 125.2 LBS | TEMPERATURE: 98.6 F | BODY MASS INDEX: 21.57 KG/M2

## 2024-03-12 DIAGNOSIS — I88.9 LYMPHADENITIS: Primary | ICD-10-CM

## 2024-03-12 PROCEDURE — 3008F BODY MASS INDEX DOCD: CPT | Performed by: PEDIATRICS

## 2024-03-12 PROCEDURE — 1036F TOBACCO NON-USER: CPT | Performed by: PEDIATRICS

## 2024-03-12 PROCEDURE — 99214 OFFICE O/P EST MOD 30 MIN: CPT | Performed by: PEDIATRICS

## 2024-03-12 RX ORDER — AMOXICILLIN AND CLAVULANATE POTASSIUM 875; 125 MG/1; MG/1
1 TABLET, FILM COATED ORAL 2 TIMES DAILY
Qty: 20 TABLET | Refills: 0 | Status: SHIPPED | OUTPATIENT
Start: 2024-03-12 | End: 2024-03-22

## 2024-03-12 NOTE — PROGRESS NOTES
Subjective   Patient ID: Amara Field is a 23 y.o. female who is here for concern of Sore Throat.    HPI  Her illness started 4 days ago.  Three days ago she was seen at an outside urgent care where she tested negative for COVID-19, flu A, fluB, and strep.  She notes that she can tell that she is starting to feel better and the fever has been gone for ~ 2 days, but she awoke with severe pain in the R side of her posterior throat.      Objective   Temperature 37 °C (98.6 °F), weight 56.8 kg (125 lb 3.2 oz).  Physical Exam  Constitutional:       General: She is not in acute distress.     Appearance: Normal appearance. She is not toxic-appearing.   HENT:      Right Ear: Tympanic membrane and ear canal normal.      Left Ear: Tympanic membrane and ear canal normal.      Nose: Congestion (mild) present.      Mouth/Throat:      Mouth: Mucous membranes are moist.      Pharynx: Posterior oropharyngeal erythema (mild R palatine arch, no ulcer) present. No oropharyngeal exudate.   Eyes:      Conjunctiva/sclera: Conjunctivae normal.   Cardiovascular:      Rate and Rhythm: Normal rate and regular rhythm.   Pulmonary:      Effort: Pulmonary effort is normal.      Breath sounds: Normal breath sounds.   Musculoskeletal:      Cervical back: Neck supple.   Lymphadenopathy:      Cervical: Cervical adenopathy present.      Right cervical: Superficial cervical adenopathy (tender, ~ 2.5 cm diameter, non-fluctuant, mobile) present.      Left cervical: Superficial cervical adenopathy (~ 2 cm diam, mobile and nontender, nonfluctuant) present.       Assessment/Plan   Problem List Items Addressed This Visit    None  Visit Diagnoses       Lymphadenitis    -  Primary    Relevant Medications    amoxicillin-pot clavulanate (Augmentin) 875-125 mg tablet        Amara appears to have acute R anterior cervical lymphadenitis.  I will err on the side of treating her for a bacterial etiology of this.  Symptomatic treatment discussed.  Follow-up if not  starting to improve in 3 days or sooner if worsens

## 2024-03-29 ENCOUNTER — LAB (OUTPATIENT)
Dept: LAB | Facility: LAB | Age: 24
End: 2024-03-29
Payer: COMMERCIAL

## 2024-03-29 ENCOUNTER — OFFICE VISIT (OUTPATIENT)
Dept: PEDIATRICS | Facility: CLINIC | Age: 24
End: 2024-03-29
Payer: COMMERCIAL

## 2024-03-29 VITALS — BODY MASS INDEX: 21.83 KG/M2 | WEIGHT: 126.7 LBS | TEMPERATURE: 98.3 F

## 2024-03-29 DIAGNOSIS — I88.9 LYMPHADENITIS: Primary | ICD-10-CM

## 2024-03-29 DIAGNOSIS — I88.9 LYMPHADENITIS: ICD-10-CM

## 2024-03-29 LAB
BASOPHILS # BLD AUTO: 0.02 X10*3/UL (ref 0–0.1)
BASOPHILS NFR BLD AUTO: 0.3 %
EOSINOPHIL # BLD AUTO: 0.07 X10*3/UL (ref 0–0.7)
EOSINOPHIL NFR BLD AUTO: 1 %
ERYTHROCYTE [DISTWIDTH] IN BLOOD BY AUTOMATED COUNT: 12.8 % (ref 11.5–14.5)
HCT VFR BLD AUTO: 37.7 % (ref 36–46)
HGB BLD-MCNC: 12.4 G/DL (ref 12–16)
IMM GRANULOCYTES # BLD AUTO: 0.01 X10*3/UL (ref 0–0.7)
IMM GRANULOCYTES NFR BLD AUTO: 0.1 % (ref 0–0.9)
LYMPHOCYTES # BLD AUTO: 2.87 X10*3/UL (ref 1.2–4.8)
LYMPHOCYTES NFR BLD AUTO: 41.1 %
MCH RBC QN AUTO: 27.6 PG (ref 26–34)
MCHC RBC AUTO-ENTMCNC: 32.9 G/DL (ref 32–36)
MCV RBC AUTO: 84 FL (ref 80–100)
MONOCYTES # BLD AUTO: 0.36 X10*3/UL (ref 0.1–1)
MONOCYTES NFR BLD AUTO: 5.2 %
NEUTROPHILS # BLD AUTO: 3.65 X10*3/UL (ref 1.2–7.7)
NEUTROPHILS NFR BLD AUTO: 52.3 %
NRBC BLD-RTO: 0 /100 WBCS (ref 0–0)
PLATELET # BLD AUTO: 355 X10*3/UL (ref 150–450)
RBC # BLD AUTO: 4.5 X10*6/UL (ref 4–5.2)
WBC # BLD AUTO: 7 X10*3/UL (ref 4.4–11.3)

## 2024-03-29 PROCEDURE — 99214 OFFICE O/P EST MOD 30 MIN: CPT | Performed by: PEDIATRICS

## 2024-03-29 PROCEDURE — 3008F BODY MASS INDEX DOCD: CPT | Performed by: PEDIATRICS

## 2024-03-29 PROCEDURE — 36415 COLL VENOUS BLD VENIPUNCTURE: CPT

## 2024-03-29 PROCEDURE — 85025 COMPLETE CBC W/AUTO DIFF WBC: CPT

## 2024-03-29 RX ORDER — CLINDAMYCIN HYDROCHLORIDE 300 MG/1
300 CAPSULE ORAL 3 TIMES DAILY
Qty: 30 CAPSULE | Refills: 0 | Status: SHIPPED | OUTPATIENT
Start: 2024-03-29 | End: 2024-04-08

## 2024-03-29 NOTE — PROGRESS NOTES
Amara Field is a 23 y.o. female who presents for Follow-up (Pt not any better).      HPI      Amara was seen a few weeks for not feeling well at an urgent care.  She had negative strep, COVID, Flu and B testing.  She then developed a swollen tender gland and was seen here and treated for an acute lymphadenitis with Augmentin for 10 days. SX's improved.  She has been off abx for about 5 days and now hs recurrence of not feeling well- swollen glands are now back and had gone down - more tired.     She had acute EBV last year 2/2023= labs reviewed and had subsequent T and A due to recurrent sore throats.      Objective   Temp 36.8 °C (98.3 °F)   Wt 57.5 kg (126 lb 11.2 oz)   BMI 21.83 kg/m²     Physical Exam  Constitutional:       Appearance: Normal appearance.   HENT:      Head: Normocephalic.      Right Ear: Tympanic membrane normal.      Left Ear: Tympanic membrane normal.      Mouth/Throat:      Mouth: Mucous membranes are moist.      Pharynx: No posterior oropharyngeal erythema.   Eyes:      Conjunctiva/sclera: Conjunctivae normal.   Cardiovascular:      Rate and Rhythm: Normal rate and regular rhythm.      Heart sounds: Normal heart sounds. No murmur heard.  Pulmonary:      Effort: Pulmonary effort is normal.      Breath sounds: Normal breath sounds. No wheezing.   Abdominal:      Palpations: Abdomen is soft. There is no hepatomegaly or splenomegaly.      Tenderness: There is no abdominal tenderness.   Lymphadenopathy:      Cervical: Cervical adenopathy present.      Right cervical: Superficial cervical adenopathy present.      Left cervical: Superficial cervical adenopathy present.      Comments: 1-2 cm - mobile and mildly tender         Assessment/Plan       Her clinical presentation and examination indicates the diagnosis of   1. Lymphadenitis  CBC and Auto Differential    clindamycin (Cleocin) 300 mg capsule            Her treatment plan includes a second course of abx.    A screening CBC was sent.  Her  sx's may be from a different viral infectious mono other than EBV- such as adenovirus or cytomegalovirus.    Today we discussed a typical course of illness, symptomatic treatment, and signs of worsening/when to seek medical care.  Supportive care measures and expected course of condition reviewed.  Followup as needed no improvement.

## 2024-04-19 ENCOUNTER — TELEMEDICINE (OUTPATIENT)
Dept: BEHAVIORAL HEALTH | Facility: CLINIC | Age: 24
End: 2024-04-19
Payer: COMMERCIAL

## 2024-04-19 DIAGNOSIS — F41.9 ANXIETY: ICD-10-CM

## 2024-04-19 DIAGNOSIS — F42.8 OBSESSIVE THINKING: ICD-10-CM

## 2024-04-19 PROCEDURE — 99214 OFFICE O/P EST MOD 30 MIN: CPT | Performed by: PSYCHIATRY & NEUROLOGY

## 2024-04-19 PROCEDURE — 3008F BODY MASS INDEX DOCD: CPT | Performed by: PSYCHIATRY & NEUROLOGY

## 2024-04-19 RX ORDER — SERTRALINE HYDROCHLORIDE 50 MG/1
50 TABLET, FILM COATED ORAL DAILY
Qty: 30 TABLET | Refills: 2 | Status: SHIPPED | OUTPATIENT
Start: 2024-04-19 | End: 2024-07-18

## 2024-04-19 NOTE — PROGRESS NOTES
"Subjective    All Individuals Present: Patient and Provider (Encounter Provider)     ID: Amara Field is a 23 y.o. female with anxiety and obsessive thinking     Interval History/HPI/PFSH:  Pt states that she has been \"pretty good.\" School has been going really well this semeter. She has finals next week and feels \"really good\" about 4/5 of them and feels worse about Analytics.    She enjoys her sociology class the most as it is interactive.    She will be done with finals next Thursday.    She will be a senior next year, \"ready to graduate.\"    On ROS, she is future-oriented to taking summer classes in [employer] and another business stats. Outside of school, she has been hanging out with friends and working out. Mood \"pretty decent.\" Denies feeling down, depressed, worried, or anxious. Appetite \"pretty normal.\" Sleep \"pretty good,\" getting about 8-9 hours. Energy \"pretty good.\" No SI/HI/AVH.     Medication side effects: None Reported     Review of Systems  See HPI.    Objective   There were no vitals taken for this visit.  Wt Readings from Last 4 Encounters:   03/29/24 57.5 kg (126 lb 11.2 oz)   03/12/24 56.8 kg (125 lb 3.2 oz)   10/13/23 53.5 kg (118 lb)   08/07/23 51.6 kg (113 lb 12.8 oz)       Mental Status Exam  General Appearance: Well groomed, appropriate eye contact.  Attitude/Behavior: Cooperative, conversant, engaged, and with good eye contact.  Motor: No psychomotor agitation or retardation, no tremor or other abnormal movements.  Speech: Normal rate, volume, prosody  Gait/Station: Within normal limits  Mood: \"pretty good\".  Affect: Euthymic, full-range  Thought Process: Linear, goal directed  Thought Associations: No loosening of associations  Thought Content: normal  Sensorium: Alert and oriented to person, place, time and situation  Insight:  intact  Judgment: Intact  Cognition: Cognitively intact to conversational testing with respect to attention, orientation, fund of knowledge, recent and " remote memory, and language.  Testing: N/A.    Laboratory/Imaging/Diagnostic Tests   N/A    Assessment/Plan   Overall Formulation and Differential Diagnosis:  Amara Field is a 23 y.o. female who meets criteria for anxiety and obsessional thinking  Interval Assessment:  Pt doing well, wrapping up current semester.  Risk Assessment:  Imminent Risk of Suicide or Serious Self-Injury: Low Risk -- Risk factors include: Age Protective factors include:Denies current suicidal ideation, Denies history of suicide attempts , Future-oriented talk , Willingness to seek help and support , Skills in problem solving, conflict resolution, and nonviolent handling of disputes, Cultural and Methodist beliefs that discourage suicide and support self-preservation , Access to a variety of clinical interventions , Receiving and engaged in care for mental, physical, and substance use disorders , History of adhering to treatment recommendations and/or prescribed medication regimen , Support through ongoing medical and mental healthcare relationships , Current/history of good response to treatment/meds , and Interpersonal relationships and supports, e.g., family, friends, peers, community   Imminent Risk of Violence or Homicide: Low Risk - Risk factors include: No significant risk factors identified on screening. Protective factors include: Lack of known history of harm to others , Lack of known history of violent ideation , Lack of known access to firearms , Sense of community, availability/access to resources and support , Sense of optimism, hope , Interpersonal competence , Affect regulation , Sense of self-efficacy, internal locus of control , and Positive, pro-social family/peer network   Treatment Plan:  There are no recently modified care plans to display for this patient.    -continue Zoloft 50mg PO daily  -RTC 2-3 months

## 2024-04-22 ENCOUNTER — OFFICE VISIT (OUTPATIENT)
Dept: PEDIATRICS | Facility: CLINIC | Age: 24
End: 2024-04-22
Payer: COMMERCIAL

## 2024-04-22 ENCOUNTER — LAB (OUTPATIENT)
Dept: LAB | Facility: LAB | Age: 24
End: 2024-04-22
Payer: COMMERCIAL

## 2024-04-22 VITALS — WEIGHT: 128.2 LBS | BODY MASS INDEX: 22.09 KG/M2 | TEMPERATURE: 98.6 F

## 2024-04-22 DIAGNOSIS — B99.9 RECURRENT INFECTIONS: ICD-10-CM

## 2024-04-22 DIAGNOSIS — L04.9 ACUTE LYMPHADENITIS: Primary | ICD-10-CM

## 2024-04-22 DIAGNOSIS — J02.9 SORE THROAT: ICD-10-CM

## 2024-04-22 LAB
IGE SERPL-ACNC: 14 IU/ML (ref 0–214)
POC RAPID STREP: NEGATIVE

## 2024-04-22 PROCEDURE — 3008F BODY MASS INDEX DOCD: CPT | Performed by: PEDIATRICS

## 2024-04-22 PROCEDURE — 99214 OFFICE O/P EST MOD 30 MIN: CPT | Performed by: PEDIATRICS

## 2024-04-22 PROCEDURE — 87651 STREP A DNA AMP PROBE: CPT

## 2024-04-22 PROCEDURE — 1036F TOBACCO NON-USER: CPT | Performed by: PEDIATRICS

## 2024-04-22 PROCEDURE — 82784 ASSAY IGA/IGD/IGG/IGM EACH: CPT

## 2024-04-22 PROCEDURE — 36415 COLL VENOUS BLD VENIPUNCTURE: CPT

## 2024-04-22 PROCEDURE — 86317 IMMUNOASSAY INFECTIOUS AGENT: CPT

## 2024-04-22 PROCEDURE — 82785 ASSAY OF IGE: CPT

## 2024-04-22 PROCEDURE — 87880 STREP A ASSAY W/OPTIC: CPT | Performed by: PEDIATRICS

## 2024-04-22 RX ORDER — CLINDAMYCIN HYDROCHLORIDE 300 MG/1
300 CAPSULE ORAL 3 TIMES DAILY
Qty: 30 CAPSULE | Refills: 0 | Status: SHIPPED | OUTPATIENT
Start: 2024-04-22 | End: 2024-05-02

## 2024-04-22 NOTE — PROGRESS NOTES
Subjective   Patient ID: Amara Field is a 23 y.o. female who is here with her mother, who gives much of the history, for concern of Fever and Sore Throat.    HPI  Patient known to me; history reviewed.  Amara started to notice a sore throat and pain in her neck 4 days ago and developed fever for 2 days. Since resolved.  She feels a bit congested but has not had rhinorrhea. She has an intermittent mild cough, feels congested - not blowing anything out of her nose  She had some stomach pain and diarrhea for a day at the onset of the illness, though it may have been related to too much coffee.  She has not had a rash.  She has college final exams this week.    I diagnosed her with acute lymphadenitis on 3/12/2024; she improved initially on Augmentin.  After being off antibiotics for 5 days she felt ill again and improved again but on clindamycin.    She is s/p tonsillectomy 7/2023 for recurrent tonsillitis.    Objective   Temperature 37 °C (98.6 °F), temperature source Temporal, weight 58.2 kg (128 lb 3.2 oz).  Physical Exam  Constitutional:       General: She is not in acute distress.     Appearance: She is ill-appearing (mildly). She is not toxic-appearing.   HENT:      Right Ear: Tympanic membrane and ear canal normal.      Left Ear: Tympanic membrane and ear canal normal.      Nose: Nose normal.      Mouth/Throat:      Mouth: Mucous membranes are moist.      Pharynx: Posterior oropharyngeal erythema (mild) present. No oropharyngeal exudate.   Eyes:      Conjunctiva/sclera: Conjunctivae normal.   Cardiovascular:      Rate and Rhythm: Normal rate and regular rhythm.   Pulmonary:      Effort: Pulmonary effort is normal.      Breath sounds: Normal breath sounds.   Abdominal:      General: There is no distension.      Palpations: There is no hepatomegaly, splenomegaly or mass.      Tenderness: There is no abdominal tenderness.   Musculoskeletal:      Cervical back: Neck supple.   Lymphadenopathy:      Cervical:  Cervical adenopathy present.      Right cervical: Superficial cervical adenopathy (< 2 cm diameter, non-fluctuant, mobile & nontender) present.      Left cervical: Superficial cervical adenopathy (~ 2 cm diam, mobile and nontender, nonfluctuant) present.     Rapid strep negative     Assessment/Plan   Problem List Items Addressed This Visit    None  Visit Diagnoses       Acute lymphadenitis    -  Primary    Relevant Medications    clindamycin (Cleocin) 300 mg capsule    Sore throat        Relevant Orders    POCT rapid strep A manually resulted (Completed)    Group A Streptococcus, PCR    Recurrent infections        Relevant Orders    IgA    IgG Subclasses (1, 2, 3, and 4)    Immunoglobulin IgE    Immunoglobulins (IgG, IgA, IgM)    Strep Pneumo IgG Ab 23 Serotypes        I will err on the side of treating her for acute lymphadenitis.  I would also like to check some immune labs and will contact her as results return.  Symptomatic treatment discussed.  Follow-up if not starting to improve in 3 days or sooner if worsens

## 2024-04-23 LAB — S PYO DNA THROAT QL NAA+PROBE: NOT DETECTED

## 2024-04-25 LAB
S PN DA SERO 19F IGG SER-MCNC: 5.3 UG/ML
S PNEUM DA 1 IGG SER-MCNC: 3.11 UG/ML
S PNEUM DA 10A IGG SER-MCNC: 2.15 UG/ML
S PNEUM DA 11A IGG SER-MCNC: 0.99 UG/ML
S PNEUM DA 12F IGG SER-MCNC: 0.2 UG/ML
S PNEUM DA 14 IGG SER-MCNC: 0.17 UG/ML
S PNEUM DA 15B IGG SER-MCNC: 0.48 UG/ML
S PNEUM DA 17F IGG SER-MCNC: 0.35 UG/ML
S PNEUM DA 18C IGG SER-MCNC: 1.48 UG/ML
S PNEUM DA 19A IGG SER-MCNC: 2.77 UG/ML
S PNEUM DA 2 IGG SER-MCNC: 5.39 UG/ML
S PNEUM DA 20A IGG SER-MCNC: 0.61 UG/ML
S PNEUM DA 22F IGG SER-MCNC: 0.12 UG/ML
S PNEUM DA 23F IGG SER-MCNC: 0.23 UG/ML
S PNEUM DA 3 IGG SER-MCNC: 0.78 UG/ML
S PNEUM DA 33F IGG SER-MCNC: 1.38 UG/ML
S PNEUM DA 4 IGG SER-MCNC: 0.5 UG/ML
S PNEUM DA 5 IGG SER-MCNC: 0.85 UG/ML
S PNEUM DA 6B IGG SER-MCNC: 1.03 UG/ML
S PNEUM DA 7F IGG SER-MCNC: 0.08 UG/ML
S PNEUM DA 8 IGG SER-MCNC: 0.64 UG/ML
S PNEUM DA 9N IGG SER-MCNC: 0.4 UG/ML
S PNEUM DA 9V IGG SER-MCNC: 0.23 UG/ML
S PNEUM SEROTYPE IGG SER-IMP: NORMAL

## 2024-05-02 LAB
IGA SERPL-MCNC: 168 MG/DL (ref 70–400)
IGG SERPL-MCNC: 1140 MG/DL (ref 700–1600)
IGG SERPL-MCNC: 1640 MG/DL (ref 700–1600)
IGG1 SER-MCNC: 721 MG/DL (ref 490–1140)
IGG2 SER-MCNC: 462 MG/DL (ref 150–640)
IGG3 SER-MCNC: 24 MG/DL (ref 11–85)
IGG4 SER-MCNC: 413 MG/DL (ref 3–200)
IGM SERPL-MCNC: 129 MG/DL (ref 40–230)

## 2024-05-07 ENCOUNTER — TELEPHONE (OUTPATIENT)
Dept: PEDIATRICS | Facility: CLINIC | Age: 24
End: 2024-05-07
Payer: COMMERCIAL

## 2024-05-07 NOTE — TELEPHONE ENCOUNTER
Spoke with mom about unread my chart message, she is going to call Amara and tell her to read her my chart message from Dr. Romo.

## 2024-06-20 PROBLEM — R76.8 WEAK ANTIBODY RESPONSE TO PNEUMOCOCCAL VACCINE: Status: ACTIVE | Noted: 2024-06-20

## 2024-06-21 ENCOUNTER — APPOINTMENT (OUTPATIENT)
Dept: PEDIATRICS | Facility: CLINIC | Age: 24
End: 2024-06-21
Payer: COMMERCIAL

## 2024-06-21 DIAGNOSIS — Z23 ENCOUNTER FOR IMMUNIZATION: ICD-10-CM

## 2024-06-21 DIAGNOSIS — R76.8 WEAK ANTIBODY RESPONSE TO PNEUMOCOCCAL VACCINE: Primary | ICD-10-CM

## 2024-06-21 PROCEDURE — 90677 PCV20 VACCINE IM: CPT | Performed by: PEDIATRICS

## 2024-06-21 PROCEDURE — 3008F BODY MASS INDEX DOCD: CPT | Performed by: PEDIATRICS

## 2024-06-21 PROCEDURE — 99213 OFFICE O/P EST LOW 20 MIN: CPT | Performed by: PEDIATRICS

## 2024-06-21 PROCEDURE — 90471 IMMUNIZATION ADMIN: CPT | Performed by: PEDIATRICS

## 2024-06-21 NOTE — PROGRESS NOTES
Subjective   Patient ID: Amara Field is a 23 y.o. female who is here with her mother, who gives much of the history, for concern of Immunizations.    HPI  Amara and her mom would like to discuss things further regarding my recommendation for this vaccine.      Objective   There were no vitals taken for this visit.  Physical Exam  Constitutional:       Appearance: Normal appearance.   HENT:      Mouth/Throat:      Mouth: Mucous membranes are moist.      Comments: Absent tonsils  Lymphadenopathy:      Cervical: Cervical adenopathy (bilat ant cerv TONY, ~1.5 cm diam, mobile and NT and without fluctuance) present.     LABS reviewed with the patient and her mother    Assessment/Plan   Problem List Items Addressed This Visit       Weak antibody response to pneumococcal vaccine - Primary    Relevant Orders    Strep Pneumo IgG Ab 23 Serotypes     Other Visit Diagnoses       Encounter for immunization        Relevant Orders    Pneumococcal conjugate vaccine, 20-valent (PREVNAR 20) (Completed)        Follow-up in 1 month at the lab for follow-up bloodwork to check immune response to this vaccine.

## 2024-06-28 ENCOUNTER — APPOINTMENT (OUTPATIENT)
Dept: BEHAVIORAL HEALTH | Facility: CLINIC | Age: 24
End: 2024-06-28
Payer: COMMERCIAL

## 2024-06-28 DIAGNOSIS — F42.8 OBSESSIVE THINKING: ICD-10-CM

## 2024-06-28 DIAGNOSIS — F41.9 ANXIETY: ICD-10-CM

## 2024-06-28 PROCEDURE — 99214 OFFICE O/P EST MOD 30 MIN: CPT | Performed by: PSYCHIATRY & NEUROLOGY

## 2024-06-28 PROCEDURE — 3008F BODY MASS INDEX DOCD: CPT | Performed by: PSYCHIATRY & NEUROLOGY

## 2024-06-28 PROCEDURE — 90833 PSYTX W PT W E/M 30 MIN: CPT | Performed by: PSYCHIATRY & NEUROLOGY

## 2024-06-28 RX ORDER — SERTRALINE HYDROCHLORIDE 50 MG/1
50 TABLET, FILM COATED ORAL DAILY
Qty: 30 TABLET | Refills: 2 | Status: SHIPPED | OUTPATIENT
Start: 2024-06-28 | End: 2024-09-26

## 2024-06-28 NOTE — PROGRESS NOTES
"Subjective    All Individuals Present: Patient and Provider (Encounter Provider)     ID: Amara Field is a 23 y.o. female with anxiety and obsessive thinking     Interval History/HPI/PFSH:  Pt states she has been \"pretty good\" since last visit. She is taking 6 summer classes, though one just ended last week. Summer classes are \"good.\"    Pt looking forward to senior year and is taking finance, operations mgmt and law. She still has to take some specialization classes.    Pt will be going on vacation to [place] with family.    On ROS, she is future-oriented to senior year of college. Mood \"pretty good.\" Denies feeling down, depressed. She can feel anxious when she has a lot of exams and projects. No change in sleep or appetite. Energy \"pretty good.\" She continues to enjoy working out and hanging out with friends. No SI/HI/AVH.     Medication side effects: None Reported     Review of Systems  See HPI.    Objective   There were no vitals taken for this visit.  Wt Readings from Last 4 Encounters:   04/22/24 58.2 kg (128 lb 3.2 oz)   03/29/24 57.5 kg (126 lb 11.2 oz)   03/12/24 56.8 kg (125 lb 3.2 oz)   10/13/23 53.5 kg (118 lb)       Mental Status Exam  General Appearance: Well groomed, appropriate eye contact.  Attitude/Behavior: Cooperative, conversant, engaged, and with good eye contact.  Motor: No psychomotor agitation or retardation, no tremor or other abnormal movements.  Speech: Normal rate, volume, prosody  Gait/Station: Within normal limits  Mood: \"pretty good\".  Affect: Euthymic, full-range  Thought Process: Linear, goal directed  Thought Associations: No loosening of associations  Thought Content: normal  Sensorium: Alert and oriented to person, place, time and situation  Insight:  intact  Judgment: Intact  Cognition: Cognitively intact to conversational testing with respect to attention, orientation, fund of knowledge, recent and remote memory, and language.  Testing: N/A.    Laboratory/Imaging/Diagnostic Tests "   N/A    Assessment/Plan   Overall Formulation and Differential Diagnosis:  Amara Field is a 23 y.o. female who meets criteria for MARGARETH and obsessive thinking  Interval Assessment:  Pt doing well, taking summer classes and looking forward to vacation.  Risk Assessment:  Imminent Risk of Suicide or Serious Self-Injury: Low Risk -- Risk factors include: Age Protective factors include:Denies current suicidal ideation, Denies history of suicide attempts , Future-oriented talk , Willingness to seek help and support , Skills in problem solving, conflict resolution, and nonviolent handling of disputes, Cultural and Presybeterian beliefs that discourage suicide and support self-preservation , Access to a variety of clinical interventions , Receiving and engaged in care for mental, physical, and substance use disorders , History of adhering to treatment recommendations and/or prescribed medication regimen , Support through ongoing medical and mental healthcare relationships , Current/history of good response to treatment/meds , and Interpersonal relationships and supports, e.g., family, friends, peers, community   Imminent Risk of Violence or Homicide: Low Risk - Risk factors include: No significant risk factors identified on screening. Protective factors include: Lack of known history of harm to others , Lack of known history of violent ideation , Lack of known access to firearms , Sense of community, availability/access to resources and support , Sense of optimism, hope , Interpersonal competence , Affect regulation , Sense of self-efficacy, internal locus of control , and Positive, pro-social family/peer network   Treatment Plan:  There are no recently modified care plans to display for this patient.    -continue Zoloft 50mg PO daily  -refer to psychology for therapy  -RTC 2-3 months    Attestation Statements   Number of minutes spent performing psychotherapy: 17min and Psychotherapy Modality: Cognitive Behavioral Therapy

## 2024-07-18 ENCOUNTER — APPOINTMENT (OUTPATIENT)
Dept: BEHAVIORAL HEALTH | Facility: CLINIC | Age: 24
End: 2024-07-18
Payer: COMMERCIAL

## 2024-07-25 ENCOUNTER — APPOINTMENT (OUTPATIENT)
Dept: BEHAVIORAL HEALTH | Facility: CLINIC | Age: 24
End: 2024-07-25
Payer: COMMERCIAL

## 2024-08-01 ENCOUNTER — APPOINTMENT (OUTPATIENT)
Dept: BEHAVIORAL HEALTH | Facility: CLINIC | Age: 24
End: 2024-08-01
Payer: COMMERCIAL

## 2024-08-08 ENCOUNTER — APPOINTMENT (OUTPATIENT)
Dept: BEHAVIORAL HEALTH | Facility: CLINIC | Age: 24
End: 2024-08-08
Payer: COMMERCIAL

## 2024-08-08 DIAGNOSIS — F42.8 OBSESSIVE THINKING: ICD-10-CM

## 2024-08-08 DIAGNOSIS — F41.9 ANXIETY: ICD-10-CM

## 2024-08-08 PROCEDURE — 99214 OFFICE O/P EST MOD 30 MIN: CPT | Performed by: PSYCHIATRY & NEUROLOGY

## 2024-08-08 NOTE — PROGRESS NOTES
"Subjective    All Individuals Present: Patient and Provider (Encounter Provider)     ID: Amara Field is a 23 y.o. female with anxiety and obsessional thinking     Interval History/HPI/PFSH:  Pt states that she has been \"pretty good\" since last visit. Pt has been working once weekly with a therapist, Josie. They have been talking about \"stress management\" and anger management.    Summer has been \"good,\" finishing her classes last week. She has 2 weeks off. Grades were \"good,\" with only 1 going not as well as she hoped.    Classes start 8/20, \"okay with it.\" Schedule is \"not too bad,\" taking 5 classes. She is done with her sociology classes. She is \"excited\" to go back to [place] and seeing her roommates.    On ROS, she enjoyed going to the beach this summer. Mood \"pretty good,\" \"a lot better.\" Anxiety has been \"pretty okay,\" \"neutral.\" She finds anxiety manageable. No change in sleep or appetite. Energy adequate. No SI/HI/AVH.     Medication side effects: None Reported     Review of Systems  See HPI.    Objective   There were no vitals taken for this visit.  Wt Readings from Last 4 Encounters:   04/22/24 58.2 kg (128 lb 3.2 oz)   03/29/24 57.5 kg (126 lb 11.2 oz)   03/12/24 56.8 kg (125 lb 3.2 oz)   10/13/23 53.5 kg (118 lb)       Mental Status Exam  General Appearance: Well groomed, appropriate eye contact.  Attitude/Behavior: Cooperative, conversant, engaged, and with good eye contact.  Motor: No psychomotor agitation or retardation, no tremor or other abnormal movements.  Speech: Normal rate, volume, prosody  Gait/Station: Within normal limits  Mood: \"good\".  Affect: Euthymic, full-range  Thought Process: Linear, goal directed  Thought Associations: No loosening of associations  Thought Content: normal  Sensorium: Alert and oriented to person, place, time and situation  Insight:  intact  Judgment: Intact  Cognition: Cognitively intact to conversational testing with respect to attention, orientation, fund of " knowledge, recent and remote memory, and language.  Testing: N/A.    Laboratory/Imaging/Diagnostic Tests   N/A    Assessment/Plan   Overall Formulation and Differential Diagnosis:  Amara Field is a 23 y.o. female who meets criteria for anxiety and obsessional thinking  Interval Assessment:  Pt doing well, finding therapy helpful and successfully completing summer classes.  Risk Assessment:  Imminent Risk of Suicide or Serious Self-Injury: Low Risk -- Risk factors include: Age Protective factors include:Denies current suicidal ideation, Denies history of suicide attempts , Future-oriented talk , Willingness to seek help and support , Skills in problem solving, conflict resolution, and nonviolent handling of disputes, Cultural and Restorationist beliefs that discourage suicide and support self-preservation , Access to a variety of clinical interventions , Receiving and engaged in care for mental, physical, and substance use disorders , History of adhering to treatment recommendations and/or prescribed medication regimen , Support through ongoing medical and mental healthcare relationships , Current/history of good response to treatment/meds , and Interpersonal relationships and supports, e.g., family, friends, peers, community   Imminent Risk of Violence or Homicide: Low Risk - Risk factors include: No significant risk factors identified on screening. Protective factors include: Lack of known history of harm to others , Lack of known history of violent ideation , Lack of known access to firearms , Sense of community, availability/access to resources and support , Sense of optimism, hope , Interpersonal competence , Affect regulation , Sense of self-efficacy, internal locus of control , and Positive, pro-social family/peer network   Treatment Plan:  There are no recently modified care plans to display for this patient.    -continue Zoloft 50mg PO daily  -continue individual therapy  -RTC 2-3 months

## 2024-08-09 ENCOUNTER — LAB (OUTPATIENT)
Dept: LAB | Facility: LAB | Age: 24
End: 2024-08-09
Payer: COMMERCIAL

## 2024-08-09 DIAGNOSIS — R76.8 WEAK ANTIBODY RESPONSE TO PNEUMOCOCCAL VACCINE: ICD-10-CM

## 2024-08-09 PROCEDURE — 36415 COLL VENOUS BLD VENIPUNCTURE: CPT

## 2024-08-09 PROCEDURE — 86317 IMMUNOASSAY INFECTIOUS AGENT: CPT

## 2024-08-14 LAB
S PN DA SERO 19F IGG SER-MCNC: 11.99 UG/ML
S PNEUM DA 1 IGG SER-MCNC: 22.83 UG/ML
S PNEUM DA 10A IGG SER-MCNC: >29.06 UG/ML
S PNEUM DA 11A IGG SER-MCNC: >3.45 UG/ML
S PNEUM DA 12F IGG SER-MCNC: 1 UG/ML
S PNEUM DA 14 IGG SER-MCNC: 1.43 UG/ML
S PNEUM DA 15B IGG SER-MCNC: 3.53 UG/ML
S PNEUM DA 17F IGG SER-MCNC: 0.38 UG/ML
S PNEUM DA 18C IGG SER-MCNC: >11.15 UG/ML
S PNEUM DA 19A IGG SER-MCNC: 39.61 UG/ML
S PNEUM DA 2 IGG SER-MCNC: 3.29 UG/ML
S PNEUM DA 20A IGG SER-MCNC: 0.74 UG/ML
S PNEUM DA 22F IGG SER-MCNC: 6.39 UG/ML
S PNEUM DA 23F IGG SER-MCNC: >9.46 UG/ML
S PNEUM DA 3 IGG SER-MCNC: 0.99 UG/ML
S PNEUM DA 33F IGG SER-MCNC: 4.67 UG/ML
S PNEUM DA 4 IGG SER-MCNC: 5.6 UG/ML
S PNEUM DA 5 IGG SER-MCNC: >21.21 UG/ML
S PNEUM DA 6B IGG SER-MCNC: 3.09 UG/ML
S PNEUM DA 7F IGG SER-MCNC: 5.81 UG/ML
S PNEUM DA 8 IGG SER-MCNC: 5.05 UG/ML
S PNEUM DA 9N IGG SER-MCNC: 0.4 UG/ML
S PNEUM DA 9V IGG SER-MCNC: 2.41 UG/ML
S PNEUM SEROTYPE IGG SER-IMP: NORMAL

## 2024-08-22 ENCOUNTER — TELEMEDICINE (OUTPATIENT)
Dept: BEHAVIORAL HEALTH | Facility: CLINIC | Age: 24
End: 2024-08-22
Payer: COMMERCIAL

## 2024-08-29 ENCOUNTER — APPOINTMENT (OUTPATIENT)
Dept: BEHAVIORAL HEALTH | Facility: CLINIC | Age: 24
End: 2024-08-29
Payer: COMMERCIAL

## 2024-09-10 ENCOUNTER — APPOINTMENT (OUTPATIENT)
Dept: BEHAVIORAL HEALTH | Facility: CLINIC | Age: 24
End: 2024-09-10
Payer: COMMERCIAL

## 2024-09-19 ENCOUNTER — APPOINTMENT (OUTPATIENT)
Dept: BEHAVIORAL HEALTH | Facility: CLINIC | Age: 24
End: 2024-09-19
Payer: COMMERCIAL

## 2024-09-19 DIAGNOSIS — F42.8 OBSESSIVE THINKING: ICD-10-CM

## 2024-09-19 DIAGNOSIS — F41.9 ANXIETY: ICD-10-CM

## 2024-09-19 PROCEDURE — 99214 OFFICE O/P EST MOD 30 MIN: CPT | Performed by: PSYCHIATRY & NEUROLOGY

## 2024-09-19 RX ORDER — SERTRALINE HYDROCHLORIDE 50 MG/1
50 TABLET, FILM COATED ORAL DAILY
Qty: 30 TABLET | Refills: 2 | Status: SHIPPED | OUTPATIENT
Start: 2024-09-19 | End: 2024-12-18

## 2024-09-19 NOTE — PROGRESS NOTES
"Subjective    All Individuals Present: Patient and Provider (Encounter Provider)     ID: Amara Field is a 24 y.o. female with anxiety and OCD    School Address: [address] [place] [address], [facility], [place], [place]     Interval History/HPI/PFSH:  Pt states she has been \"pretty good\" since last visit, \"pretty busy schedule\" but she thinks it is good for her as it keeps her busy.    She will be graduating in 5/2025, which she is \"excited\" for. She needs a \"break\" from school.    She is enjoying specialized classes in business, coming up with business plans.    Favorite class is marketing research.    On ROS, pt future-oriented to graduating. She is enjoying hanging out with friends, going to football games, and playing tennis. Mood \"pretty good.\" Denies feeling overly down, sad, anxious, or worried. She thinks being busy helps her anxiety. No SI/HI/AVH.    She continues to meet with her therapist.     Medication side effects: None Reported     Review of Systems  See HPI.    Objective   There were no vitals taken for this visit.  Wt Readings from Last 4 Encounters:   04/22/24 58.2 kg (128 lb 3.2 oz)   03/29/24 57.5 kg (126 lb 11.2 oz)   03/12/24 56.8 kg (125 lb 3.2 oz)   03/09/24 52.2 kg (115 lb 1.3 oz)       Mental Status Exam  General Appearance: Well groomed, appropriate eye contact.  Attitude/Behavior: Cooperative, conversant, engaged, and with good eye contact.  Motor: No psychomotor agitation or retardation, no tremor or other abnormal movements.  Speech: Normal rate, volume, prosody  Gait/Station: Within normal limits  Mood: \"pretty good\".  Affect: Euthymic, full-range  Thought Process: Linear, goal directed  Thought Associations: No loosening of associations  Thought Content: normal  Sensorium: Alert and oriented to person, place, time and situation  Insight:  intact  Judgment: Intact  Cognition: Cognitively intact to conversational testing with respect to attention, orientation, fund of knowledge, recent and remote " memory, and language.  Testing: N/A.    Laboratory/Imaging/Diagnostic Tests   N/A    Assessment/Plan   Overall Formulation and Differential Diagnosis:  Amara Field is a 24 y.o. female who meets criteria for anxiety and obsessional thinking  Interval Assessment:  Pt doing well, finding therapy helpful and looking forward to graduating spring 2025.  Risk Assessment:  Imminent Risk of Suicide or Serious Self-Injury: Low Risk -- Risk factors include: Age Protective factors include:Denies current suicidal ideation, Denies history of suicide attempts , Future-oriented talk , Willingness to seek help and support , Skills in problem solving, conflict resolution, and nonviolent handling of disputes, Cultural and Confucianism beliefs that discourage suicide and support self-preservation , Access to a variety of clinical interventions , Receiving and engaged in care for mental, physical, and substance use disorders , History of adhering to treatment recommendations and/or prescribed medication regimen , Support through ongoing medical and mental healthcare relationships , Current/history of good response to treatment/meds , and Interpersonal relationships and supports, e.g., family, friends, peers, community   Imminent Risk of Violence or Homicide: Low Risk - Risk factors include: No significant risk factors identified on screening. Protective factors include: Lack of known history of harm to others , Lack of known history of violent ideation , Lack of known access to firearms , Sense of community, availability/access to resources and support , Sense of optimism, hope , Interpersonal competence , Affect regulation , Sense of self-efficacy, internal locus of control , and Positive, pro-social family/peer network   Treatment Plan:  There are no recently modified care plans to display for this patient.     -continue Zoloft 50mg PO daily  -continue individual therapy  -RTC 2-3 months

## 2024-10-14 ENCOUNTER — APPOINTMENT (OUTPATIENT)
Dept: PEDIATRICS | Facility: CLINIC | Age: 24
End: 2024-10-14
Payer: COMMERCIAL

## 2024-10-30 ENCOUNTER — APPOINTMENT (OUTPATIENT)
Dept: PEDIATRICS | Facility: CLINIC | Age: 24
End: 2024-10-30
Payer: COMMERCIAL

## 2024-10-30 VITALS
WEIGHT: 127.9 LBS | SYSTOLIC BLOOD PRESSURE: 123 MMHG | DIASTOLIC BLOOD PRESSURE: 69 MMHG | BODY MASS INDEX: 21.83 KG/M2 | HEIGHT: 64 IN | HEART RATE: 70 BPM

## 2024-10-30 DIAGNOSIS — Z00.00 WELLNESS EXAMINATION: Primary | ICD-10-CM

## 2024-10-30 PROBLEM — L70.9 ACNE: Status: RESOLVED | Noted: 2023-03-09 | Resolved: 2024-10-30

## 2024-10-30 PROCEDURE — 99395 PREV VISIT EST AGE 18-39: CPT | Performed by: PEDIATRICS

## 2024-10-30 PROCEDURE — 3008F BODY MASS INDEX DOCD: CPT | Performed by: PEDIATRICS

## 2024-10-30 PROCEDURE — 1036F TOBACCO NON-USER: CPT | Performed by: PEDIATRICS

## 2024-10-30 ASSESSMENT — PATIENT HEALTH QUESTIONNAIRE - PHQ9
1. LITTLE INTEREST OR PLEASURE IN DOING THINGS: NOT AT ALL
SUM OF ALL RESPONSES TO PHQ QUESTIONS 1-9: 3
2. FEELING DOWN, DEPRESSED OR HOPELESS: SEVERAL DAYS
9. THOUGHTS THAT YOU WOULD BE BETTER OFF DEAD, OR OF HURTING YOURSELF: NOT AT ALL
8. MOVING OR SPEAKING SO SLOWLY THAT OTHER PEOPLE COULD HAVE NOTICED. OR THE OPPOSITE - BEING SO FIDGETY OR RESTLESS THAT YOU HAVE BEEN MOVING AROUND A LOT MORE THAN USUAL: NOT AT ALL
4. FEELING TIRED OR HAVING LITTLE ENERGY: SEVERAL DAYS
3. TROUBLE FALLING OR STAYING ASLEEP OR SLEEPING TOO MUCH: SEVERAL DAYS
10. IF YOU CHECKED OFF ANY PROBLEMS, HOW DIFFICULT HAVE THESE PROBLEMS MADE IT FOR YOU TO DO YOUR WORK, TAKE CARE OF THINGS AT HOME, OR GET ALONG WITH OTHER PEOPLE: NOT DIFFICULT AT ALL
5. POOR APPETITE OR OVEREATING: NOT AT ALL
7. TROUBLE CONCENTRATING ON THINGS, SUCH AS READING THE NEWSPAPER OR WATCHING TELEVISION: NOT AT ALL
6. FEELING BAD ABOUT YOURSELF - OR THAT YOU ARE A FAILURE OR HAVE LET YOURSELF OR YOUR FAMILY DOWN: NOT AT ALL
2. FEELING DOWN, DEPRESSED OR HOPELESS: SEVERAL DAYS
8. MOVING OR SPEAKING SO SLOWLY THAT OTHER PEOPLE COULD HAVE NOTICED. OR THE OPPOSITE, BEING SO FIGETY OR RESTLESS THAT YOU HAVE BEEN MOVING AROUND A LOT MORE THAN USUAL: NOT AT ALL
10. IF YOU CHECKED OFF ANY PROBLEMS, HOW DIFFICULT HAVE THESE PROBLEMS MADE IT FOR YOU TO DO YOUR WORK, TAKE CARE OF THINGS AT HOME, OR GET ALONG WITH OTHER PEOPLE: NOT DIFFICULT AT ALL
SUM OF ALL RESPONSES TO PHQ9 QUESTIONS 1 & 2: 1
5. POOR APPETITE OR OVEREATING: NOT AT ALL
4. FEELING TIRED OR HAVING LITTLE ENERGY: SEVERAL DAYS
3. TROUBLE FALLING OR STAYING ASLEEP: SEVERAL DAYS
1. LITTLE INTEREST OR PLEASURE IN DOING THINGS: NOT AT ALL
7. TROUBLE CONCENTRATING ON THINGS, SUCH AS READING THE NEWSPAPER OR WATCHING TELEVISION: NOT AT ALL
6. FEELING BAD ABOUT YOURSELF - OR THAT YOU ARE A FAILURE OR HAVE LET YOURSELF OR YOUR FAMILY DOWN: NOT AT ALL
9. THOUGHTS THAT YOU WOULD BE BETTER OFF DEAD, OR OF HURTING YOURSELF: NOT AT ALL

## 2024-11-21 ENCOUNTER — APPOINTMENT (OUTPATIENT)
Dept: BEHAVIORAL HEALTH | Facility: CLINIC | Age: 24
End: 2024-11-21
Payer: COMMERCIAL

## 2024-11-21 DIAGNOSIS — F42.8 OBSESSIVE THINKING: ICD-10-CM

## 2024-11-21 DIAGNOSIS — F41.9 ANXIETY: ICD-10-CM

## 2024-11-21 PROCEDURE — 99214 OFFICE O/P EST MOD 30 MIN: CPT | Performed by: PSYCHIATRY & NEUROLOGY

## 2024-11-21 RX ORDER — SERTRALINE HYDROCHLORIDE 50 MG/1
50 TABLET, FILM COATED ORAL DAILY
Qty: 30 TABLET | Refills: 2 | Status: SHIPPED | OUTPATIENT
Start: 2024-11-21 | End: 2025-02-19

## 2024-11-21 NOTE — PROGRESS NOTES
"Subjective    All Individuals Present: Patient and Provider (Encounter Provider)     ID: Amara Field is a 24 y.o. female with anxiety and OCD sxs     School Address: [address] [place] [address], [facility], [place], [place]     Interval History/HPI/PFSH:  Pt stats she has been \"pretty good\" since last visit, wrapping up the semester, which is \"a lot.\"    Pt looking forward to 4 weeks off for Winter Break, going to [place] for a wedding and [place] to relax.    On ROS, pt enjoys hanging out with friends, shopping, and playing pickleball. Mood \"pretty good.\" Denies feeling overly down, depressed. She feels more anxious this week related to school, but feels like it is still manageable. No change in sleep or appetite. She has been feeling more tired as she feels sick. No SI/HI/AVH.     Medication side effects: None Reported     Review of Systems  See HPI.    Objective   There were no vitals taken for this visit.  Wt Readings from Last 4 Encounters:   10/30/24 58 kg (127 lb 14.4 oz)   04/22/24 58.2 kg (128 lb 3.2 oz)   03/29/24 57.5 kg (126 lb 11.2 oz)   03/12/24 56.8 kg (125 lb 3.2 oz)       Mental Status Exam  General Appearance: Well groomed, appropriate eye contact.  Attitude/Behavior: Cooperative, conversant, engaged, and with good eye contact.  Motor: No psychomotor agitation or retardation, no tremor or other abnormal movements.  Speech: Normal rate, volume, prosody  Gait/Station: Within normal limits  Mood: \"pretty good\".  Affect: Euthymic, full-range  Thought Process: Linear, goal directed  Thought Associations: No loosening of associations  Thought Content: normal  Sensorium: Alert and oriented to person, place, time and situation  Insight:  intact  Judgment: Intact  Cognition: Cognitively intact to conversational testing with respect to attention, orientation, fund of knowledge, recent and remote memory, and language.  Testing: N/A.    Laboratory/Imaging/Diagnostic Tests   N/A    Assessment/Plan   Overall Formulation and " Differential Diagnosis:  Amara Field is a 24 y.o. female who meets criteria for anxiety and obsessional thinking  Interval Assessment:  Pt doing well, finding therapy helpful and looking forward to break.  Risk Assessment:  Imminent Risk of Suicide or Serious Self-Injury: Low Risk -- Risk factors include: Age Protective factors include:Denies current suicidal ideation, Denies history of suicide attempts , Future-oriented talk , Willingness to seek help and support , Skills in problem solving, conflict resolution, and nonviolent handling of disputes, Cultural and Church beliefs that discourage suicide and support self-preservation , Access to a variety of clinical interventions , Receiving and engaged in care for mental, physical, and substance use disorders , History of adhering to treatment recommendations and/or prescribed medication regimen , Support through ongoing medical and mental healthcare relationships , Current/history of good response to treatment/meds , and Interpersonal relationships and supports, e.g., family, friends, peers, community   Imminent Risk of Violence or Homicide: Low Risk - Risk factors include: No significant risk factors identified on screening. Protective factors include: Lack of known history of harm to others , Lack of known history of violent ideation , Lack of known access to firearms , Sense of community, availability/access to resources and support , Sense of optimism, hope , Interpersonal competence , Affect regulation , Sense of self-efficacy, internal locus of control , and Positive, pro-social family/peer network   Treatment Plan:  There are no recently modified care plans to display for this patient.     -continue Zoloft 50mg PO daily  -continue individual therapy  -RTC 2-3 months

## 2024-11-27 ENCOUNTER — OFFICE VISIT (OUTPATIENT)
Dept: URGENT CARE | Age: 24
End: 2024-11-27
Payer: COMMERCIAL

## 2024-11-27 VITALS
WEIGHT: 125 LBS | HEART RATE: 85 BPM | OXYGEN SATURATION: 98 % | BODY MASS INDEX: 21.54 KG/M2 | TEMPERATURE: 98.2 F | SYSTOLIC BLOOD PRESSURE: 109 MMHG | RESPIRATION RATE: 16 BRPM | DIASTOLIC BLOOD PRESSURE: 78 MMHG

## 2024-11-27 DIAGNOSIS — J02.9 ACUTE PHARYNGITIS, UNSPECIFIED ETIOLOGY: Primary | ICD-10-CM

## 2024-11-27 DIAGNOSIS — J02.9 SORE THROAT: ICD-10-CM

## 2024-11-27 LAB
POC BINAX EXPIRATION: NORMAL
POC BINAX NOW COVID SERIAL NUMBER: NORMAL
POC RAPID INFLUENZA A: NEGATIVE
POC RAPID INFLUENZA B: NEGATIVE
POC RAPID STREP: NEGATIVE
POC SARS-COV-2 AG BINAX: NORMAL

## 2024-11-27 PROCEDURE — 87651 STREP A DNA AMP PROBE: CPT

## 2024-11-27 ASSESSMENT — PATIENT HEALTH QUESTIONNAIRE - PHQ9
2. FEELING DOWN, DEPRESSED OR HOPELESS: NOT AT ALL
SUM OF ALL RESPONSES TO PHQ9 QUESTIONS 1 AND 2: 0
1. LITTLE INTEREST OR PLEASURE IN DOING THINGS: NOT AT ALL

## 2024-11-27 ASSESSMENT — ENCOUNTER SYMPTOMS
SORE THROAT: 1
FATIGUE: 1
EYES NEGATIVE: 1
COUGH: 1

## 2024-11-27 ASSESSMENT — PAIN SCALES - GENERAL: PAINLEVEL_OUTOF10: 6

## 2024-11-27 NOTE — PROGRESS NOTES
Subjective   Patient ID: Amara Field is a 24 y.o. female. They present today with a chief complaint of Sore Throat (Patient here with sore throat since Sunday was in contact with someone who was sick ) and Illness.    History of Present Illness    Sore Throat   Associated symptoms include congestion and coughing.   Illness  Associated symptoms: congestion, cough, fatigue and sore throat        Past Medical History  Allergies as of 11/27/2024    (No Known Allergies)       (Not in a hospital admission)       Past Medical History:   Diagnosis Date    Acute vaginitis 03/12/2021    Bacterial vaginosis    COVID-19 03/09/2023    DEC 2001    Personal history of other diseases of the digestive system 10/12/2020    History of gastroesophageal reflux (GERD)    Personal history of other diseases of the female genital tract 03/05/2021    History of vaginal discharge    Personal history of other infectious and parasitic diseases 02/13/2021    History of candidal vulvovaginitis    Personal history of other specified conditions 08/11/2020    History of nausea and vomiting    Personal history of other specified conditions 08/11/2020    History of abdominal pain    Recurrent acute tonsillitis 10/12/2023       Past Surgical History:   Procedure Laterality Date    TONSILLECTOMY Bilateral 07/18/2023    Jessica Vogel MD        reports that she has never smoked. She has never used smokeless tobacco.    Review of Systems  Review of Systems   Constitutional:  Positive for fatigue.   HENT:  Positive for congestion and sore throat.    Eyes: Negative.    Respiratory:  Positive for cough.                                   Objective    Vitals:    11/27/24 1105   BP: 109/78   BP Location: Left arm   Patient Position: Sitting   Pulse: 85   Resp: 16   Temp: 36.8 °C (98.2 °F)   TempSrc: Oral   SpO2: 98%   Weight: 56.7 kg (125 lb)     Patient's last menstrual period was 11/11/2024 (approximate).    Physical Exam  Constitutional:       Appearance:  Normal appearance.   HENT:      Right Ear: Tympanic membrane is bulging.      Nose: Congestion present.      Mouth/Throat:      Pharynx: Pharyngeal swelling and posterior oropharyngeal erythema present.   Eyes:      Conjunctiva/sclera: Conjunctivae normal.   Lymphadenopathy:      Cervical: Cervical adenopathy present.      Right cervical: Superficial cervical adenopathy present.      Left cervical: Superficial cervical adenopathy present.   Neurological:      Mental Status: She is alert.         Procedures    Point of Care Test & Imaging Results from this visit  Results for orders placed or performed in visit on 11/27/24   POCT Influenza A/B manually resulted   Result Value Ref Range    POC Rapid Influenza A Negative Negative    POC Rapid Influenza B Negative Negative   POCT rapid strep A manually resulted   Result Value Ref Range    POC Rapid Strep Negative Negative      No results found.    Diagnostic study results (if any) were reviewed by Cathleen Cohen MD.    Assessment/Plan   Allergies, medications, history, and pertinent labs/EKGs/Imaging reviewed by Cathleen Cohen MD.     Medical Decision Making      Orders and Diagnoses  Diagnoses and all orders for this visit:  Sore throat  -     POCT Influenza A/B manually resulted  -     POCT rapid strep A manually resulted      Medical Admin Record      Patient disposition: Home    Electronically signed by Cathleen Cohen MD  11:35 AM

## 2024-11-27 NOTE — PATIENT INSTRUCTIONS
Drink plenty of fluids   If Sore throat persists you might need Mono test  Take 600mg Ibuprofen with food and plenty of water

## 2024-11-28 LAB — S PYO DNA THROAT QL NAA+PROBE: NOT DETECTED

## 2025-01-30 ENCOUNTER — APPOINTMENT (OUTPATIENT)
Dept: BEHAVIORAL HEALTH | Facility: CLINIC | Age: 25
End: 2025-01-30
Payer: COMMERCIAL

## 2025-01-31 ENCOUNTER — TELEPHONE (OUTPATIENT)
Dept: OBSTETRICS AND GYNECOLOGY | Facility: CLINIC | Age: 25
End: 2025-01-31
Payer: COMMERCIAL

## 2025-01-31 NOTE — TELEPHONE ENCOUNTER
Called patient and left voicemail that appointment for 2/3/25 was scheduled in error.      Nery Monge CNA

## 2025-02-03 ENCOUNTER — APPOINTMENT (OUTPATIENT)
Dept: OBSTETRICS AND GYNECOLOGY | Facility: CLINIC | Age: 25
End: 2025-02-03
Payer: COMMERCIAL

## 2025-02-06 ENCOUNTER — APPOINTMENT (OUTPATIENT)
Dept: BEHAVIORAL HEALTH | Facility: CLINIC | Age: 25
End: 2025-02-06
Payer: COMMERCIAL

## 2025-02-13 ENCOUNTER — APPOINTMENT (OUTPATIENT)
Dept: BEHAVIORAL HEALTH | Facility: CLINIC | Age: 25
End: 2025-02-13
Payer: COMMERCIAL

## 2025-02-13 DIAGNOSIS — F41.9 ANXIETY: ICD-10-CM

## 2025-02-13 DIAGNOSIS — F42.8 OBSESSIVE THINKING: ICD-10-CM

## 2025-02-13 PROCEDURE — 99214 OFFICE O/P EST MOD 30 MIN: CPT | Performed by: PSYCHIATRY & NEUROLOGY

## 2025-02-13 RX ORDER — SERTRALINE HYDROCHLORIDE 50 MG/1
50 TABLET, FILM COATED ORAL DAILY
Qty: 30 TABLET | Refills: 2 | Status: SHIPPED | OUTPATIENT
Start: 2025-02-13 | End: 2025-05-14

## 2025-02-13 NOTE — PROGRESS NOTES
"Subjective    All Individuals Present: Patient and Provider (Encounter Provider)     ID: Amara Field is a 24 y.o. female with anxiety and OCD sxs     School Address: [address] [place] [address], [facility], [place], [place]      Interval History/HPI/PFSH:  Pt states she has been \"okay\" since last visit. She had the flu \"bad\" about 2 weeks ago, \"terrible.\" She feels more tired than usual, otherwise \"fine.\"    Things are \"pretty good\" otherwise, \"pretty easy semester.\" It is mostly group projects and presentations. Everyone has been participating, \"fun,\" as everyone graduating.    It is pt's last semester, \"a little bit sad\" but also feels \"burnt out\" on school. Pt not sure about graduation plans, thinking about finance or marketing.    On ROS, pt feels tired 2/2 recent flu. Pt future-oriented to graduation. Pt enjoys hanging out for fun and hoping to work out more. Mood \"pretty good\" overall. Denies feeling down or depressed. Anxiety \"less than usual\" as school is easy this semester. No SI/HI/AVH.     Medication side effects: None Reported     Review of Systems  Constitutional: Negative  Psychiatric: Negative  Neurological: Negative   Other: N/A    Objective   There were no vitals taken for this visit.  Wt Readings from Last 4 Encounters:   11/27/24 56.7 kg (125 lb)   10/30/24 58 kg (127 lb 14.4 oz)   04/22/24 58.2 kg (128 lb 3.2 oz)   03/29/24 57.5 kg (126 lb 11.2 oz)       Mental Status Exam  General Appearance: Well groomed, appropriate eye contact.  Attitude/Behavior: Cooperative, conversant, engaged, and with good eye contact.  Motor: No psychomotor agitation or retardation, no tremor or other abnormal movements.  Speech: Normal rate, volume, prosody  Gait/Station: Within normal limits  Mood: \"pretty good\".  Affect: Euthymic, full-range  Thought Process: Linear, goal directed  Thought Associations: No loosening of associations  Thought Content: normal  Sensorium: Alert and oriented to person, place, time and " situation  Insight:  intact  Judgment: Intact  Cognition: Cognitively intact to conversational testing with respect to attention, orientation, fund of knowledge, recent and remote memory, and language.  Testing: N/A.    Laboratory/Imaging/Diagnostic Tests       Assessment/Plan   Overall Formulation and Differential Diagnosis:  Amara Field is a 24 y.o. female who meets criteria for anxiety and obsessional thinking  Interval Assessment:  Pt doing well, looking forward to graduation.  Risk Assessment:  Imminent Risk of Suicide or Serious Self-Injury: Low Risk -- Risk factors include: Age Protective factors include:Denies current suicidal ideation, Denies history of suicide attempts , Future-oriented talk , Willingness to seek help and support , Skills in problem solving, conflict resolution, and nonviolent handling of disputes, Cultural and Muslim beliefs that discourage suicide and support self-preservation , Access to a variety of clinical interventions , Receiving and engaged in care for mental, physical, and substance use disorders , History of adhering to treatment recommendations and/or prescribed medication regimen , Support through ongoing medical and mental healthcare relationships , Current/history of good response to treatment/meds , and Interpersonal relationships and supports, e.g., family, friends, peers, community   Imminent Risk of Violence or Homicide: Low Risk - Risk factors include: No significant risk factors identified on screening. Protective factors include: Lack of known history of harm to others , Lack of known history of violent ideation , Lack of known access to firearms , Sense of community, availability/access to resources and support , Sense of optimism, hope , Interpersonal competence , Affect regulation , Sense of self-efficacy, internal locus of control , and Positive, pro-social family/peer network   Treatment Plan:  There are no recently modified care plans to display for this  patient.     -continue Zoloft 50mg PO daily  -continue individual therapy  -RTC 2-3 months

## 2025-03-11 ENCOUNTER — APPOINTMENT (OUTPATIENT)
Dept: OBSTETRICS AND GYNECOLOGY | Facility: CLINIC | Age: 25
End: 2025-03-11
Payer: COMMERCIAL

## 2025-04-10 ENCOUNTER — APPOINTMENT (OUTPATIENT)
Dept: BEHAVIORAL HEALTH | Facility: CLINIC | Age: 25
End: 2025-04-10
Payer: COMMERCIAL

## 2025-04-10 DIAGNOSIS — F42.8 OBSESSIVE THINKING: ICD-10-CM

## 2025-04-10 DIAGNOSIS — F41.9 ANXIETY: ICD-10-CM

## 2025-04-10 PROCEDURE — 99214 OFFICE O/P EST MOD 30 MIN: CPT | Performed by: PSYCHIATRY & NEUROLOGY

## 2025-04-10 RX ORDER — SERTRALINE HYDROCHLORIDE 50 MG/1
50 TABLET, FILM COATED ORAL DAILY
Qty: 30 TABLET | Refills: 2 | Status: SHIPPED | OUTPATIENT
Start: 2025-04-10 | End: 2025-07-09

## 2025-04-10 NOTE — PROGRESS NOTES
"Subjective    All Individuals Present: Patient and Provider (Encounter Provider)     ID: Amara Field is a 24 y.o. female with anxiety and obsessional thinking      School Address: [address] [place] [address], [facility], [place], [place]     Interval History/HPI/PFSH:  Pt states that she has been \"pretty good\" since last visit. She is wrapping up the semester, but only has 2 exams with a \"lot of presentations,\" \"honestly going fine.\" Pt feels \"fine\" about finals, \"not worried.\"    Pt unsure what she will be doing following graduation. Pt unsure if she wants to stay in [place] or go somewhere else.    On ROS, pt future-oriented to graduation. She has been enjoying working out and hanging out with friends before graduation. Mood \"pretty good.\" Denies feeling down or depressed. She can feel \"a little bit anxious,\" related to school and finding a job. No change in sleep or appetite. No SI/HI/AVH.     Medication side effects: None Reported     Review of Systems  See HPI.    Objective   There were no vitals taken for this visit.  Wt Readings from Last 4 Encounters:   11/27/24 56.7 kg (125 lb)   10/30/24 58 kg (127 lb 14.4 oz)   04/22/24 58.2 kg (128 lb 3.2 oz)   03/29/24 57.5 kg (126 lb 11.2 oz)       Mental Status Exam  General Appearance: Well groomed, appropriate eye contact.  Attitude/Behavior: Cooperative, conversant, engaged, and with good eye contact.  Motor: No psychomotor agitation or retardation, no tremor or other abnormal movements.  Speech: Normal rate, volume, prosody  Gait/Station: Within normal limits  Mood: \"pretty good\".  Affect: Euthymic, full-range  Thought Process: Linear, goal directed  Thought Associations: No loosening of associations  Thought Content: normal  Sensorium: Alert and oriented to person, place, time and situation  Insight:  intact  Judgment: Intact  Cognition: Cognitively intact to conversational testing with respect to attention, orientation, fund of knowledge, recent and remote memory, and " language.  Testing: N/A.    Laboratory/Imaging/Diagnostic Tests       Assessment/Plan   Overall Formulation and Differential Diagnosis:  Amara Field is a 24 y.o. female who meets criteria for anxiety and obsessional thinking  Interval Assessment:  Pt doing well, looking forward to graduation.  Risk Assessment:  Imminent Risk of Suicide or Serious Self-Injury: Low Risk -- Risk factors include: Age Protective factors include:Denies current suicidal ideation, Denies history of suicide attempts , Future-oriented talk , Willingness to seek help and support , Skills in problem solving, conflict resolution, and nonviolent handling of disputes, Cultural and Orthodox beliefs that discourage suicide and support self-preservation , Access to a variety of clinical interventions , Receiving and engaged in care for mental, physical, and substance use disorders , History of adhering to treatment recommendations and/or prescribed medication regimen , Support through ongoing medical and mental healthcare relationships , Current/history of good response to treatment/meds , and Interpersonal relationships and supports, e.g., family, friends, peers, community   Imminent Risk of Violence or Homicide: Low Risk - Risk factors include: No significant risk factors identified on screening. Protective factors include: Lack of known history of harm to others , Lack of known history of violent ideation , Lack of known access to firearms , Sense of community, availability/access to resources and support , Sense of optimism, hope , Interpersonal competence , Affect regulation , Sense of self-efficacy, internal locus of control , and Positive, pro-social family/peer network   Treatment Plan:  There are no recently modified care plans to display for this patient.     -continue Zoloft 50mg PO daily  -continue individual therapy  -RTC 2-3 months

## 2025-06-03 ENCOUNTER — APPOINTMENT (OUTPATIENT)
Dept: BEHAVIORAL HEALTH | Facility: CLINIC | Age: 25
End: 2025-06-03
Payer: COMMERCIAL

## 2025-06-03 DIAGNOSIS — F41.9 ANXIETY: ICD-10-CM

## 2025-06-03 DIAGNOSIS — F42.8 OBSESSIVE THINKING: ICD-10-CM

## 2025-06-03 PROCEDURE — 99214 OFFICE O/P EST MOD 30 MIN: CPT | Performed by: PSYCHIATRY & NEUROLOGY

## 2025-06-03 RX ORDER — SERTRALINE HYDROCHLORIDE 50 MG/1
50 TABLET, FILM COATED ORAL DAILY
Qty: 30 TABLET | Refills: 2 | Status: SHIPPED | OUTPATIENT
Start: 2025-06-03 | End: 2025-09-01

## 2025-06-03 NOTE — PROGRESS NOTES
"Subjective    All Individuals Present: Patient and Provider (Encounter Provider)     ID: Amara Field is a 24 y.o. female with anxiety     Interval History/HPI/PFSH:  Pt states she graduated and has been \"good,\" graduated and looking for jobs.    Pt has moved home since graduation, rock climbing a lot at [employer].    Pt interested in marketing, supply chain, or finance.    On ROS, pt enjoying hanging out with friends and rock climbing. Future-oriented to job search. Mood \"pretty good.\"      Medication side effects: None Reported     Review of Systems  Constitutional: Negative  Psychiatric: Negative  Neurological: Negative   Other: N/A    Objective   There were no vitals taken for this visit.  Wt Readings from Last 4 Encounters:   11/27/24 56.7 kg (125 lb)   10/30/24 58 kg (127 lb 14.4 oz)   04/22/24 58.2 kg (128 lb 3.2 oz)   03/29/24 57.5 kg (126 lb 11.2 oz)       Mental Status Exam  General Appearance: Well groomed, appropriate eye contact.  Attitude/Behavior: Cooperative, conversant, engaged, and with good eye contact.  Motor: No psychomotor agitation or retardation, no tremor or other abnormal movements.  Speech: Normal rate, volume, prosody  Gait/Station: Within normal limits  Mood: \"good\".  Affect: Euthymic, full-range  Thought Process: Linear, goal directed  Thought Associations: No loosening of associations  Thought Content: normal  Sensorium: Alert and oriented to person, place, time and situation  Insight: intact  Judgment: Intact  Cognition: Cognitively intact to conversational testing with respect to attention, orientation, fund of knowledge, recent and remote memory, and language.  Testing: N/A.    Laboratory/Imaging/Diagnostic Tests       Assessment/Plan   Overall Formulation and Differential Diagnosis:  Amara Fiedl is a 24 y.o. female who meets criteria for anxiety and obsessional thinking  Interval Assessment:  Pt doing well, looking for jobs.  Risk Assessment:  Imminent Risk of Suicide or " Serious Self-Injury: Low Risk -- Risk factors include: Age Protective factors include:Denies current suicidal ideation, Denies history of suicide attempts , Future-oriented talk , Willingness to seek help and support , Skills in problem solving, conflict resolution, and nonviolent handling of disputes, Cultural and Anabaptism beliefs that discourage suicide and support self-preservation , Access to a variety of clinical interventions , Receiving and engaged in care for mental, physical, and substance use disorders , History of adhering to treatment recommendations and/or prescribed medication regimen , Support through ongoing medical and mental healthcare relationships , Current/history of good response to treatment/meds , and Interpersonal relationships and supports, e.g., family, friends, peers, community   Imminent Risk of Violence or Homicide: Low Risk - Risk factors include: No significant risk factors identified on screening. Protective factors include: Lack of known history of harm to others , Lack of known history of violent ideation , Lack of known access to firearms , Sense of community, availability/access to resources and support , Sense of optimism, hope , Interpersonal competence , Affect regulation , Sense of self-efficacy, internal locus of control , and Positive, pro-social family/peer network   Treatment Plan:  There are no recently modified care plans to display for this patient.     -continue Zoloft 50mg PO daily  -continue individual therapy  -RTC 2-3 months

## 2025-08-19 ENCOUNTER — APPOINTMENT (OUTPATIENT)
Dept: BEHAVIORAL HEALTH | Facility: CLINIC | Age: 25
End: 2025-08-19
Payer: COMMERCIAL

## 2025-08-20 ENCOUNTER — APPOINTMENT (OUTPATIENT)
Dept: BEHAVIORAL HEALTH | Facility: CLINIC | Age: 25
End: 2025-08-20
Payer: COMMERCIAL

## 2025-08-20 DIAGNOSIS — F42.8 OBSESSIVE THINKING: ICD-10-CM

## 2025-08-20 DIAGNOSIS — F41.9 ANXIETY: ICD-10-CM

## 2025-08-20 PROCEDURE — 99214 OFFICE O/P EST MOD 30 MIN: CPT | Performed by: PSYCHIATRY & NEUROLOGY

## 2025-08-20 RX ORDER — SERTRALINE HYDROCHLORIDE 50 MG/1
50 TABLET, FILM COATED ORAL DAILY
Qty: 30 TABLET | Refills: 2 | Status: SHIPPED | OUTPATIENT
Start: 2025-08-20 | End: 2025-11-18

## 2025-11-12 ENCOUNTER — APPOINTMENT (OUTPATIENT)
Dept: BEHAVIORAL HEALTH | Facility: CLINIC | Age: 25
End: 2025-11-12
Payer: COMMERCIAL